# Patient Record
Sex: FEMALE | Race: ASIAN | NOT HISPANIC OR LATINO | Employment: FULL TIME
[De-identification: names, ages, dates, MRNs, and addresses within clinical notes are randomized per-mention and may not be internally consistent; named-entity substitution may affect disease eponyms.]

---

## 2017-06-17 ENCOUNTER — HEALTH MAINTENANCE LETTER (OUTPATIENT)
Age: 57
End: 2017-06-17

## 2019-11-02 ENCOUNTER — OFFICE VISIT (OUTPATIENT)
Dept: URGENT CARE | Facility: URGENT CARE | Age: 59
End: 2019-11-02
Payer: COMMERCIAL

## 2019-11-02 VITALS
TEMPERATURE: 98.6 F | SYSTOLIC BLOOD PRESSURE: 102 MMHG | DIASTOLIC BLOOD PRESSURE: 66 MMHG | BODY MASS INDEX: 21.71 KG/M2 | RESPIRATION RATE: 16 BRPM | HEART RATE: 88 BPM | WEIGHT: 115 LBS | HEIGHT: 61 IN

## 2019-11-02 DIAGNOSIS — M25.50 POLYARTHRALGIA: Primary | ICD-10-CM

## 2019-11-02 LAB
ALBUMIN SERPL-MCNC: 3.4 G/DL (ref 3.4–5)
ALP SERPL-CCNC: 104 U/L (ref 40–150)
ALT SERPL W P-5'-P-CCNC: 17 U/L (ref 0–50)
ANION GAP SERPL CALCULATED.3IONS-SCNC: 4 MMOL/L (ref 3–14)
AST SERPL W P-5'-P-CCNC: 12 U/L (ref 0–45)
BASOPHILS # BLD AUTO: 0 10E9/L (ref 0–0.2)
BASOPHILS NFR BLD AUTO: 0.1 %
BILIRUB SERPL-MCNC: 0.2 MG/DL (ref 0.2–1.3)
BUN SERPL-MCNC: 11 MG/DL (ref 7–30)
CALCIUM SERPL-MCNC: 8.3 MG/DL (ref 8.5–10.1)
CHLORIDE SERPL-SCNC: 107 MMOL/L (ref 94–109)
CO2 SERPL-SCNC: 27 MMOL/L (ref 20–32)
CREAT SERPL-MCNC: 0.65 MG/DL (ref 0.52–1.04)
CRP SERPL-MCNC: 38 MG/L (ref 0–8)
DIFFERENTIAL METHOD BLD: ABNORMAL
EOSINOPHIL # BLD AUTO: 0.2 10E9/L (ref 0–0.7)
EOSINOPHIL NFR BLD AUTO: 1.5 %
ERYTHROCYTE [DISTWIDTH] IN BLOOD BY AUTOMATED COUNT: 11.8 % (ref 10–15)
ERYTHROCYTE [SEDIMENTATION RATE] IN BLOOD BY WESTERGREN METHOD: 72 MM/H (ref 0–30)
GFR SERPL CREATININE-BSD FRML MDRD: >90 ML/MIN/{1.73_M2}
GLUCOSE SERPL-MCNC: 103 MG/DL (ref 70–99)
HCT VFR BLD AUTO: 33 % (ref 35–47)
HGB BLD-MCNC: 11 G/DL (ref 11.7–15.7)
LYMPHOCYTES # BLD AUTO: 1.7 10E9/L (ref 0.8–5.3)
LYMPHOCYTES NFR BLD AUTO: 14.8 %
MCH RBC QN AUTO: 30.4 PG (ref 26.5–33)
MCHC RBC AUTO-ENTMCNC: 33.3 G/DL (ref 31.5–36.5)
MCV RBC AUTO: 91 FL (ref 78–100)
MONOCYTES # BLD AUTO: 1.2 10E9/L (ref 0–1.3)
MONOCYTES NFR BLD AUTO: 10.4 %
NEUTROPHILS # BLD AUTO: 8.4 10E9/L (ref 1.6–8.3)
NEUTROPHILS NFR BLD AUTO: 73.2 %
PLATELET # BLD AUTO: 386 10E9/L (ref 150–450)
POTASSIUM SERPL-SCNC: 4.1 MMOL/L (ref 3.4–5.3)
PROT SERPL-MCNC: 7.3 G/DL (ref 6.8–8.8)
RBC # BLD AUTO: 3.62 10E12/L (ref 3.8–5.2)
SODIUM SERPL-SCNC: 138 MMOL/L (ref 133–144)
URATE SERPL-MCNC: 4.6 MG/DL (ref 2.6–6)
WBC # BLD AUTO: 11.5 10E9/L (ref 4–11)

## 2019-11-02 PROCEDURE — 99203 OFFICE O/P NEW LOW 30 MIN: CPT | Performed by: PHYSICIAN ASSISTANT

## 2019-11-02 PROCEDURE — 85652 RBC SED RATE AUTOMATED: CPT | Performed by: PHYSICIAN ASSISTANT

## 2019-11-02 PROCEDURE — 84550 ASSAY OF BLOOD/URIC ACID: CPT | Performed by: PHYSICIAN ASSISTANT

## 2019-11-02 PROCEDURE — 86140 C-REACTIVE PROTEIN: CPT | Performed by: PHYSICIAN ASSISTANT

## 2019-11-02 PROCEDURE — 86618 LYME DISEASE ANTIBODY: CPT | Performed by: PHYSICIAN ASSISTANT

## 2019-11-02 PROCEDURE — 85025 COMPLETE CBC W/AUTO DIFF WBC: CPT | Performed by: PHYSICIAN ASSISTANT

## 2019-11-02 PROCEDURE — 36415 COLL VENOUS BLD VENIPUNCTURE: CPT | Performed by: PHYSICIAN ASSISTANT

## 2019-11-02 PROCEDURE — 80053 COMPREHEN METABOLIC PANEL: CPT | Performed by: PHYSICIAN ASSISTANT

## 2019-11-02 RX ORDER — METHYLPREDNISOLONE 4 MG
TABLET, DOSE PACK ORAL
Qty: 21 TABLET | Refills: 0 | Status: SHIPPED | OUTPATIENT
Start: 2019-11-02 | End: 2019-12-19

## 2019-11-02 ASSESSMENT — MIFFLIN-ST. JEOR: SCORE: 1039.02

## 2019-11-02 NOTE — PROGRESS NOTES
Patient presents with:  Hand Pain: bilateral hand pain for past month. No known injury  SUBJECTIVE:  Chief Complaint   Patient presents with     Hand Pain     bilateral hand pain for past month. No known injury     Balbina Donohue is a 59 year old female presents with a chief complaint of joint pain for the past month.  No known injury.        Was seen by work comp clinic about a month ago.  Last time was yesterday.  They told her that her pain in her wrist and now her other wrist and shoulders is not work comp and she was told to be evaluated by her regular doctor.      She also now has swelling in both hands mainly fingers.  Also complains of bilateral shoulder pain.      She is taking naprosyn and tramadol for her pain.    Takes naprosyn twice a day and tramadol up to 4 times a day.      Wearing a splint on both wrists at night for the past month.,      States that she hurt her wrists at work.    She does not currently have a PCP.      SH: She is here with her  today.          Past Medical History:   Diagnosis Date     Abnormal glandular Papanicolaou smear of cervix 3/00    per patient f/u paps normal     ASCUS on Pap smear 12/18/09    Hpv-neg.     Esophageal stricture 6/21/10    benign esophageal stricture dilated at EGD     Normal delivery     2 childbirths, no complications with regnancies or delivery     Thyroid nodule 9/8/10    left thyroid lobectomy, benign degenerated colloid nodule per path     Patient Active Problem List   Diagnosis     Esophageal stricture     Thyroid nodule     CARDIOVASCULAR SCREENING; LDL GOAL LESS THAN 160     Iron deficiency anemia     B12 deficiency anemia     Vitamin D deficiency     ASCUS on Pap smear     Social History     Tobacco Use     Smoking status: Never Smoker     Smokeless tobacco: Never Used   Substance Use Topics     Alcohol use: No       ROS:  CONSTITUTIONAL:NEGATIVE for fever, chills, change in weight  INTEGUMENTARY/SKIN: NEGATIVE for worrisome rashes, moles  "or lesions  EYES: NEGATIVE for vision changes or irritation  ENT/MOUTH: NEGATIVE for ear, mouth and throat problems  RESP:NEGATIVE for significant cough or SOB  CV: NEGATIVE for chest pain, palpitations or peripheral edema  GI: NEGATIVE for nausea, abdominal pain, heartburn, or change in bowel habits  : negative  MUSCULOSKELETAL: as per HPI  NEURO: NEGATIVE for weakness, dizziness or paresthesias  ENDOCRINE: NEGATIVE for temperature intolerance, skin/hair changes  HEME/ALLERGY/IMMUNE: NEGATIVE for bleeding problems    EXAM:   /66 (Cuff Size: Adult Regular)   Pulse 88   Temp 98.6  F (37  C) (Oral)   Resp 16   Ht 1.549 m (5' 1\")   Wt 52.2 kg (115 lb)   LMP 10/06/2005   BMI 21.73 kg/m    Gen: healthy,alert,no distress  Extremity: right wrist: tenderness with subtle swelling.  Decreased ROM secondary to pain.    Left wrist: mild tenderness to palpation without swelling.    Fingers of right hand with some swelling.  No erythema.   There is not compromise to the distal circulation.  Pulses are +2 and CRT is brisk  SHOULDERS: no crepitus.  ROM is wnl.  Mildly tender to palpation bilaterally   GENERAL APPEARANCE: healthy, alert and no distress  SKIN: no suspicious lesions or rashes  NEURO: Normal strength and tone, sensory exam grossly normal, mentation intact and speech normal    Results for orders placed or performed in visit on 11/02/19   CBC with platelets and differential     Status: Abnormal   Result Value Ref Range    WBC 11.5 (H) 4.0 - 11.0 10e9/L    RBC Count 3.62 (L) 3.8 - 5.2 10e12/L    Hemoglobin 11.0 (L) 11.7 - 15.7 g/dL    Hematocrit 33.0 (L) 35.0 - 47.0 %    MCV 91 78 - 100 fl    MCH 30.4 26.5 - 33.0 pg    MCHC 33.3 31.5 - 36.5 g/dL    RDW 11.8 10.0 - 15.0 %    Platelet Count 386 150 - 450 10e9/L    % Neutrophils 73.2 %    % Lymphocytes 14.8 %    % Monocytes 10.4 %    % Eosinophils 1.5 %    % Basophils 0.1 %    Absolute Neutrophil 8.4 (H) 1.6 - 8.3 10e9/L    Absolute Lymphocytes 1.7 0.8 - 5.3 " 10e9/L    Absolute Monocytes 1.2 0.0 - 1.3 10e9/L    Absolute Eosinophils 0.2 0.0 - 0.7 10e9/L    Absolute Basophils 0.0 0.0 - 0.2 10e9/L    Diff Method Automated Method    ESR: Erythrocyte sedimentation rate     Status: Abnormal   Result Value Ref Range    Sed Rate 72 (H) 0 - 30 mm/h   CRP, inflammation     Status: Abnormal   Result Value Ref Range    CRP Inflammation 38.0 (H) 0.0 - 8.0 mg/L   Comprehensive metabolic panel (BMP + Alb, Alk Phos, ALT, AST, Total. Bili, TP)     Status: Abnormal   Result Value Ref Range    Sodium 138 133 - 144 mmol/L    Potassium 4.1 3.4 - 5.3 mmol/L    Chloride 107 94 - 109 mmol/L    Carbon Dioxide 27 20 - 32 mmol/L    Anion Gap 4 3 - 14 mmol/L    Glucose 103 (H) 70 - 99 mg/dL    Urea Nitrogen 11 7 - 30 mg/dL    Creatinine 0.65 0.52 - 1.04 mg/dL    GFR Estimate >90 >60 mL/min/[1.73_m2]    GFR Estimate If Black >90 >60 mL/min/[1.73_m2]    Calcium 8.3 (L) 8.5 - 10.1 mg/dL    Bilirubin Total 0.2 0.2 - 1.3 mg/dL    Albumin 3.4 3.4 - 5.0 g/dL    Protein Total 7.3 6.8 - 8.8 g/dL    Alkaline Phosphatase 104 40 - 150 U/L    ALT 17 0 - 50 U/L    AST 12 0 - 45 U/L   Uric acid     Status: None   Result Value Ref Range    Uric Acid 4.6 2.6 - 6.0 mg/dL     (M25.50) Polyarthralgia  (primary encounter diagnosis)  Comment:   Plan: CBC with platelets and differential, ESR:         Erythrocyte sedimentation rate, CRP,         inflammation, Lyme Disease Bhumika with reflex to         WB Serum, Comprehensive metabolic panel (BMP +         Alb, Alk Phos, ALT, AST, Total. Bili, TP), Uric        acid, methylPREDNISolone (MEDROL DOSEPAK) 4 MG         tablet therapy pack            Follow up with internal medicine within one week.  Appointment made for patient.    Patient expresses understanding and agreement with the assessment and plan as above.

## 2019-11-02 NOTE — PATIENT INSTRUCTIONS
(M25.50) Polyarthralgia  (primary encounter diagnosis)  Comment:   Plan: CBC with platelets and differential, ESR:         Erythrocyte sedimentation rate, CRP,         inflammation, Lyme Disease Bhumika with reflex to         WB Serum, Comprehensive metabolic panel (BMP +         Alb, Alk Phos, ALT, AST, Total. Bili, TP), Uric        acid, methylPREDNISolone (MEDROL DOSEPAK) 4 MG         tablet therapy pack            Follow up with internal medicine within one week.  Appointment made for patient.

## 2019-11-04 LAB — B BURGDOR IGG+IGM SER QL: 0.05 (ref 0–0.89)

## 2019-11-08 ENCOUNTER — OFFICE VISIT (OUTPATIENT)
Dept: INTERNAL MEDICINE | Facility: CLINIC | Age: 59
End: 2019-11-08
Payer: COMMERCIAL

## 2019-11-08 VITALS
RESPIRATION RATE: 18 BRPM | HEART RATE: 83 BPM | TEMPERATURE: 98.1 F | SYSTOLIC BLOOD PRESSURE: 104 MMHG | WEIGHT: 116 LBS | OXYGEN SATURATION: 99 % | BODY MASS INDEX: 21.92 KG/M2 | DIASTOLIC BLOOD PRESSURE: 66 MMHG

## 2019-11-08 DIAGNOSIS — M25.50 PAIN IN JOINT, MULTIPLE SITES: Primary | ICD-10-CM

## 2019-11-08 PROCEDURE — 99213 OFFICE O/P EST LOW 20 MIN: CPT | Performed by: PHYSICIAN ASSISTANT

## 2019-11-08 RX ORDER — IBUPROFEN 600 MG/1
600 TABLET, FILM COATED ORAL EVERY 8 HOURS PRN
Qty: 60 TABLET | Refills: 0 | Status: SHIPPED | OUTPATIENT
Start: 2019-11-08 | End: 2023-10-27

## 2019-11-08 NOTE — PROGRESS NOTES
Subjective     Balbina Donohue is a 59 year old female who presents to clinic today for the following health issues:    HPI   ED/UC Followup:    Facility:  [facility]  Date of visit: 11/02/2019  Reason for visit: hand/wrist pain, lab work showing elevated inflammation markers and anemia, medrol dose pack given   Current Status: Dose pack helped but since stopping it symptoms have come back.        Multiple site joint pain:  Onset: 1 month ago   Description: pain and swelling in hands and finger, prior shoulder and knee pain and swollen  (now gone)  - never had this before     Low hemoglobin    - history of b12 and iron deficiency anemia in the past       Reviewed and updated as needed this visit by Provider  Meds  Problems             Objective    /66   Pulse 83   Temp 98.1  F (36.7  C) (Oral)   Resp 18   Wt 52.6 kg (116 lb)   LMP 10/06/2005   SpO2 99%   BMI 21.92 kg/m    Body mass index is 21.92 kg/m .  Physical Exam   GENERAL: healthy, alert and no distress  MS: bilateral fingers are swollen along the joints and tender to touch, no warmth, maybe slight erythema, no other joint tenderness or swelling noted      Diagnostic Test Results:  Labs reviewed in Epic        Assessment & Plan     1. Pain in joint, multiple sites  - referral for further evaluation and treatment   - RHEUMATOLOGY REFERRAL  - ibuprofen (ADVIL/MOTRIN) 600 MG tablet; Take 1 tablet (600 mg) by mouth every 8 hours as needed for moderate pain  Dispense: 60 tablet; Refill: 0    Anemia noted as well, pt declines further work up for this at this time, but will follow up in 1 month for recheck and further evaluation.        Return in about 1 month (around 12/8/2019) for Physical Exam and anemia follow up .    Mimi Kemp PA-C  [facility]

## 2019-12-04 ENCOUNTER — TRANSFERRED RECORDS (OUTPATIENT)
Dept: HEALTH INFORMATION MANAGEMENT | Facility: CLINIC | Age: 59
End: 2019-12-04

## 2019-12-15 ENCOUNTER — HEALTH MAINTENANCE LETTER (OUTPATIENT)
Age: 59
End: 2019-12-15

## 2019-12-19 ENCOUNTER — ANCILLARY PROCEDURE (OUTPATIENT)
Dept: GENERAL RADIOLOGY | Facility: CLINIC | Age: 59
End: 2019-12-19
Attending: PHYSICIAN ASSISTANT
Payer: COMMERCIAL

## 2019-12-19 ENCOUNTER — OFFICE VISIT (OUTPATIENT)
Dept: INTERNAL MEDICINE | Facility: CLINIC | Age: 59
End: 2019-12-19
Payer: COMMERCIAL

## 2019-12-19 VITALS
OXYGEN SATURATION: 99 % | WEIGHT: 116.5 LBS | RESPIRATION RATE: 18 BRPM | BODY MASS INDEX: 22.01 KG/M2 | HEART RATE: 91 BPM | SYSTOLIC BLOOD PRESSURE: 112 MMHG | DIASTOLIC BLOOD PRESSURE: 66 MMHG | TEMPERATURE: 98.3 F

## 2019-12-19 DIAGNOSIS — Z11.1 SCREENING EXAMINATION FOR PULMONARY TUBERCULOSIS: Primary | ICD-10-CM

## 2019-12-19 PROCEDURE — 99213 OFFICE O/P EST LOW 20 MIN: CPT | Performed by: PHYSICIAN ASSISTANT

## 2019-12-19 PROCEDURE — 71046 X-RAY EXAM CHEST 2 VIEWS: CPT

## 2019-12-19 RX ORDER — PREDNISONE 5 MG/1
TABLET ORAL
COMMUNITY
Start: 2019-12-04 | End: 2023-10-27

## 2019-12-19 NOTE — PROGRESS NOTES
Subjective     Balbina Donohue is a 59 year old female who presents to clinic today for the following health issues:    HPI     Patient is here today because she had testing completed through rheumatology and that was one of her tests came back positive for TB. Patient states she has a cough at times.   Night sweats/fever: none   Some weight loss and loss of appetite with joint pain   Fatigue: none     Reviewed and updated as needed this visit by Provider  Meds  Problems           Objective    /66   Pulse 91   Temp 98.3  F (36.8  C) (Oral)   Resp 18   Wt 52.8 kg (116 lb 8 oz)   LMP 10/06/2005   SpO2 99%   BMI 22.01 kg/m    Body mass index is 22.01 kg/m .  Physical Exam   GENERAL: healthy, alert and no distress  RESP: lungs clear to auscultation - no rales, rhonchi or wheezes  CV: regular rates and rhythm, normal S1 S2, no S3 or S4 and no murmur, click or rub    Diagnostic Test Results:  Labs reviewed in Epic        Assessment & Plan     1. Screening examination for pulmonary tuberculosis  - if latent TB, pt would like to consider treatment will follow up in clinic to begin treatment   - XR Chest 2 Views    Declined follow up on anemia will consider on follow up.      No follow-ups on file.    Mimi Kemp PA-C  [facility]

## 2019-12-20 ENCOUNTER — TELEPHONE (OUTPATIENT)
Dept: INTERNAL MEDICINE | Facility: CLINIC | Age: 59
End: 2019-12-20

## 2019-12-20 NOTE — TELEPHONE ENCOUNTER
PCP patient son, Jah called, CTC on file.      Wondering if there was any next steps regarding Chest X-ray results.     Rhonda HALEY, RN, PHN

## 2019-12-31 ENCOUNTER — OFFICE VISIT (OUTPATIENT)
Dept: INTERNAL MEDICINE | Facility: CLINIC | Age: 59
End: 2019-12-31
Payer: COMMERCIAL

## 2019-12-31 VITALS
RESPIRATION RATE: 16 BRPM | DIASTOLIC BLOOD PRESSURE: 60 MMHG | SYSTOLIC BLOOD PRESSURE: 115 MMHG | OXYGEN SATURATION: 98 % | HEART RATE: 84 BPM | WEIGHT: 118.1 LBS | TEMPERATURE: 98.2 F | BODY MASS INDEX: 22.31 KG/M2

## 2019-12-31 DIAGNOSIS — E53.8 VITAMIN B12 DEFICIENCY (NON ANEMIC): ICD-10-CM

## 2019-12-31 DIAGNOSIS — Z22.7 TB LUNG, LATENT: Primary | ICD-10-CM

## 2019-12-31 DIAGNOSIS — D64.9 ANEMIA, UNSPECIFIED TYPE: ICD-10-CM

## 2019-12-31 DIAGNOSIS — R73.09 ABNORMAL BLOOD SUGAR: ICD-10-CM

## 2019-12-31 LAB
BASOPHILS # BLD AUTO: 0 10E9/L (ref 0–0.2)
BASOPHILS NFR BLD AUTO: 0.2 %
DIFFERENTIAL METHOD BLD: ABNORMAL
EOSINOPHIL # BLD AUTO: 0 10E9/L (ref 0–0.7)
EOSINOPHIL NFR BLD AUTO: 0.1 %
ERYTHROCYTE [DISTWIDTH] IN BLOOD BY AUTOMATED COUNT: 14.2 % (ref 10–15)
HCT VFR BLD AUTO: 36.3 % (ref 35–47)
HGB BLD-MCNC: 11.7 G/DL (ref 11.7–15.7)
LYMPHOCYTES # BLD AUTO: 1 10E9/L (ref 0.8–5.3)
LYMPHOCYTES NFR BLD AUTO: 7.9 %
MCH RBC QN AUTO: 29.6 PG (ref 26.5–33)
MCHC RBC AUTO-ENTMCNC: 32.2 G/DL (ref 31.5–36.5)
MCV RBC AUTO: 92 FL (ref 78–100)
MONOCYTES # BLD AUTO: 0.3 10E9/L (ref 0–1.3)
MONOCYTES NFR BLD AUTO: 2 %
NEUTROPHILS # BLD AUTO: 11.3 10E9/L (ref 1.6–8.3)
NEUTROPHILS NFR BLD AUTO: 89.8 %
PLATELET # BLD AUTO: 278 10E9/L (ref 150–450)
RBC # BLD AUTO: 3.95 10E12/L (ref 3.8–5.2)
VIT B12 SERPL-MCNC: 140 PG/ML (ref 193–986)
WBC # BLD AUTO: 12.6 10E9/L (ref 4–11)

## 2019-12-31 PROCEDURE — 82607 VITAMIN B-12: CPT | Performed by: PHYSICIAN ASSISTANT

## 2019-12-31 PROCEDURE — 82728 ASSAY OF FERRITIN: CPT | Performed by: PHYSICIAN ASSISTANT

## 2019-12-31 PROCEDURE — 80053 COMPREHEN METABOLIC PANEL: CPT | Performed by: PHYSICIAN ASSISTANT

## 2019-12-31 PROCEDURE — 82746 ASSAY OF FOLIC ACID SERUM: CPT | Performed by: PHYSICIAN ASSISTANT

## 2019-12-31 PROCEDURE — 99214 OFFICE O/P EST MOD 30 MIN: CPT | Performed by: PHYSICIAN ASSISTANT

## 2019-12-31 PROCEDURE — 83550 IRON BINDING TEST: CPT | Performed by: PHYSICIAN ASSISTANT

## 2019-12-31 PROCEDURE — 36415 COLL VENOUS BLD VENIPUNCTURE: CPT | Performed by: PHYSICIAN ASSISTANT

## 2019-12-31 PROCEDURE — 85025 COMPLETE CBC W/AUTO DIFF WBC: CPT | Performed by: PHYSICIAN ASSISTANT

## 2019-12-31 PROCEDURE — 83540 ASSAY OF IRON: CPT | Performed by: PHYSICIAN ASSISTANT

## 2019-12-31 RX ORDER — RIFAMPIN 300 MG/1
600 CAPSULE ORAL DAILY
Qty: 60 CAPSULE | Refills: 3 | Status: SHIPPED | OUTPATIENT
Start: 2019-12-31 | End: 2020-04-16

## 2019-12-31 NOTE — PROGRESS NOTES
Subjective     Balbina Donohue is a 59 year old female who presents to clinic today for the following health issues:    HPI     Patient is here to discuss TB treatment. She has had a positive TB gold and a negative CXR. She would like to seek treatment for latent TB. She has no other symptoms or concerns. On review of her chart she is due for follow up on anemia. Anemia was noted in Urgent Care and has not been worked up. Pt no longer has a menstrual cycle. She is a vegetarian.    Reviewed and updated as needed this visit by Provider  Meds  Problems             Objective    /60 (BP Location: Right arm, Patient Position: Chair, Cuff Size: Adult Regular)   Pulse 84   Temp 98.2  F (36.8  C) (Oral)   Resp 16   Wt 53.6 kg (118 lb 1.6 oz)   LMP 10/06/2005   SpO2 98%   BMI 22.31 kg/m    Body mass index is 22.31 kg/m .  Physical Exam   GENERAL: healthy, alert and no distress  RESP: lungs clear to auscultation - no rales, rhonchi or wheezes  CV: regular rates and rhythm, normal S1 S2, no S3 or S4 and no murmur, click or rub    Diagnostic Test Results:  Labs reviewed in Epic        Assessment & Plan     1. TB lung, latent  - reviewed risks vs benefits  - reviewed serious risks of hepatitis and what to watch for and what to if symptoms occur  - plan to recheck in 1 month   - rifampin (RIFADIN) 300 MG capsule; Take 2 capsules (600 mg) by mouth daily  Dispense: 60 capsule; Refill: 3  - Comprehensive metabolic panel  - CBC with platelets and differential    2. Anemia, unspecified type  - anemia noted on previous labs, work up as below   - Ferritin  - Iron and iron binding capacity  - Vitamin B12  - Folate       Return in about 1 month (around 1/31/2020) for medication follow up.    Mimi Kemp PA-C  [facility]

## 2020-01-01 LAB — FOLATE SERPL-MCNC: 19.2 NG/ML

## 2020-01-02 LAB
ALBUMIN SERPL-MCNC: 3.5 G/DL (ref 3.4–5)
ALP SERPL-CCNC: 93 U/L (ref 40–150)
ALT SERPL W P-5'-P-CCNC: 21 U/L (ref 0–50)
ANION GAP SERPL CALCULATED.3IONS-SCNC: 5 MMOL/L (ref 3–14)
AST SERPL W P-5'-P-CCNC: 15 U/L (ref 0–45)
BILIRUB SERPL-MCNC: 0.2 MG/DL (ref 0.2–1.3)
BUN SERPL-MCNC: 15 MG/DL (ref 7–30)
CALCIUM SERPL-MCNC: 8.5 MG/DL (ref 8.5–10.1)
CHLORIDE SERPL-SCNC: 110 MMOL/L (ref 94–109)
CO2 SERPL-SCNC: 26 MMOL/L (ref 20–32)
CREAT SERPL-MCNC: 0.68 MG/DL (ref 0.52–1.04)
FERRITIN SERPL-MCNC: 43 NG/ML (ref 8–252)
GFR SERPL CREATININE-BSD FRML MDRD: >90 ML/MIN/{1.73_M2}
GLUCOSE SERPL-MCNC: 247 MG/DL (ref 70–99)
IRON SATN MFR SERPL: 14 % (ref 15–46)
IRON SERPL-MCNC: 46 UG/DL (ref 35–180)
POTASSIUM SERPL-SCNC: 4.2 MMOL/L (ref 3.4–5.3)
PROT SERPL-MCNC: 6.3 G/DL (ref 6.8–8.8)
SODIUM SERPL-SCNC: 141 MMOL/L (ref 133–144)
TIBC SERPL-MCNC: 320 UG/DL (ref 240–430)

## 2020-01-03 RX ORDER — LANOLIN ALCOHOL/MO/W.PET/CERES
1000 CREAM (GRAM) TOPICAL DAILY
Qty: 90 TABLET | Refills: 1 | Status: SHIPPED | OUTPATIENT
Start: 2020-01-03

## 2020-01-09 DIAGNOSIS — R73.09 ABNORMAL BLOOD SUGAR: ICD-10-CM

## 2020-01-09 LAB — HBA1C MFR BLD: 5.9 % (ref 0–5.6)

## 2020-01-09 PROCEDURE — 83036 HEMOGLOBIN GLYCOSYLATED A1C: CPT | Performed by: PHYSICIAN ASSISTANT

## 2020-01-09 PROCEDURE — 36415 COLL VENOUS BLD VENIPUNCTURE: CPT | Performed by: PHYSICIAN ASSISTANT

## 2020-01-14 ENCOUNTER — TRANSFERRED RECORDS (OUTPATIENT)
Dept: HEALTH INFORMATION MANAGEMENT | Facility: CLINIC | Age: 60
End: 2020-01-14

## 2020-01-27 ENCOUNTER — TELEPHONE (OUTPATIENT)
Dept: INTERNAL MEDICINE | Facility: CLINIC | Age: 60
End: 2020-01-27

## 2020-01-27 NOTE — TELEPHONE ENCOUNTER
Reason for Call:  Other call back    Detailed comments: Patient was informed to call back if she feels fatigued.  Patient has an appointment scheduled for 01/31/2020. Please talk with her son Jah, he does have permission to talk with us.      Phone Number Patient can be reached at: Cell number on file:    Telephone Information:   Mobile [phone]       Best Time: Afternoon    Can we leave a detailed message on this number? YES    Call taken on 1/27/2020 at 4:50 PM by Dallas Ordoñez

## 2020-01-28 NOTE — TELEPHONE ENCOUNTER
Triage spoke with patient sonJah.    Advised to keep scheduled appointment. No new sx. If any other symptoms to please call back and speak with nurse. Patient is to follow up with PCP per LOV note.    Rhonda HALEY, RN, PHN

## 2020-01-31 ENCOUNTER — OFFICE VISIT (OUTPATIENT)
Dept: INTERNAL MEDICINE | Facility: CLINIC | Age: 60
End: 2020-01-31
Payer: COMMERCIAL

## 2020-01-31 VITALS
DIASTOLIC BLOOD PRESSURE: 78 MMHG | BODY MASS INDEX: 21.9 KG/M2 | OXYGEN SATURATION: 98 % | SYSTOLIC BLOOD PRESSURE: 118 MMHG | RESPIRATION RATE: 18 BRPM | TEMPERATURE: 98.3 F | HEIGHT: 61 IN | WEIGHT: 116 LBS | HEART RATE: 81 BPM

## 2020-01-31 DIAGNOSIS — Z22.7 TB LUNG, LATENT: Primary | ICD-10-CM

## 2020-01-31 DIAGNOSIS — Z11.59 ENCOUNTER FOR HEPATITIS C SCREENING TEST FOR LOW RISK PATIENT: ICD-10-CM

## 2020-01-31 DIAGNOSIS — Z13.220 LIPID SCREENING: ICD-10-CM

## 2020-01-31 DIAGNOSIS — Z11.4 ENCOUNTER FOR SCREENING FOR HIV: ICD-10-CM

## 2020-01-31 LAB
ALBUMIN SERPL-MCNC: 4.2 G/DL (ref 3.4–5)
ALP SERPL-CCNC: 93 U/L (ref 40–150)
ALT SERPL W P-5'-P-CCNC: 21 U/L (ref 0–50)
ANION GAP SERPL CALCULATED.3IONS-SCNC: 6 MMOL/L (ref 3–14)
AST SERPL W P-5'-P-CCNC: 19 U/L (ref 0–45)
BILIRUB SERPL-MCNC: 0.4 MG/DL (ref 0.2–1.3)
BUN SERPL-MCNC: 9 MG/DL (ref 7–30)
CALCIUM SERPL-MCNC: 8.7 MG/DL (ref 8.5–10.1)
CHLORIDE SERPL-SCNC: 107 MMOL/L (ref 94–109)
CHOLEST SERPL-MCNC: 160 MG/DL
CO2 SERPL-SCNC: 24 MMOL/L (ref 20–32)
CREAT SERPL-MCNC: 0.64 MG/DL (ref 0.52–1.04)
GFR SERPL CREATININE-BSD FRML MDRD: >90 ML/MIN/{1.73_M2}
GLUCOSE SERPL-MCNC: 104 MG/DL (ref 70–99)
HDLC SERPL-MCNC: 79 MG/DL
LDLC SERPL CALC-MCNC: 68 MG/DL
NONHDLC SERPL-MCNC: 81 MG/DL
POTASSIUM SERPL-SCNC: 4.4 MMOL/L (ref 3.4–5.3)
PROT SERPL-MCNC: 7.2 G/DL (ref 6.8–8.8)
SODIUM SERPL-SCNC: 137 MMOL/L (ref 133–144)
TRIGL SERPL-MCNC: 65 MG/DL

## 2020-01-31 PROCEDURE — 80061 LIPID PANEL: CPT | Performed by: PHYSICIAN ASSISTANT

## 2020-01-31 PROCEDURE — 80053 COMPREHEN METABOLIC PANEL: CPT | Performed by: PHYSICIAN ASSISTANT

## 2020-01-31 PROCEDURE — 87389 HIV-1 AG W/HIV-1&-2 AB AG IA: CPT | Performed by: PHYSICIAN ASSISTANT

## 2020-01-31 PROCEDURE — 86803 HEPATITIS C AB TEST: CPT | Performed by: PHYSICIAN ASSISTANT

## 2020-01-31 PROCEDURE — 99213 OFFICE O/P EST LOW 20 MIN: CPT | Performed by: PHYSICIAN ASSISTANT

## 2020-01-31 PROCEDURE — 36415 COLL VENOUS BLD VENIPUNCTURE: CPT | Performed by: PHYSICIAN ASSISTANT

## 2020-01-31 RX ORDER — HYDROXYCHLOROQUINE SULFATE 200 MG/1
TABLET, FILM COATED ORAL
COMMUNITY
Start: 2020-01-14

## 2020-01-31 ASSESSMENT — MIFFLIN-ST. JEOR: SCORE: 1043.55

## 2020-01-31 NOTE — PROGRESS NOTES
"Subjective     Balbina Donohue is a 59 year old female who presents to clinic today for the following health issues:    HPI   Medication Followup of Rifampin     Taking Medication as prescribed: yes    Side Effects:  tired    Medication Helping Symptoms:  Yes    Feeling more tired since starting medication     Denies abdominal symptoms, dark urine, loss of appetite     Reviewed and updated as needed this visit by Provider  Meds  Problems             Objective    /78 (BP Location: Right arm, Patient Position: Chair, Cuff Size: Adult Regular)   Pulse 81   Temp 98.3  F (36.8  C) (Oral)   Resp 18   Ht 1.549 m (5' 1\")   Wt 52.6 kg (116 lb)   LMP 10/06/2005   SpO2 98%   BMI 21.92 kg/m    Body mass index is 21.92 kg/m .  Physical Exam   GENERAL: healthy, alert and no distress  RESP: lungs clear to auscultation - no rales, rhonchi or wheezes  CV: regular rates and rhythm, normal S1 S2, no S3 or S4 and no murmur, click or rub  ABDOMEN: soft, nontender, no hepatosplenomegaly, no masses and bowel sounds normal    Diagnostic Test Results:  Labs reviewed in Epic        Assessment & Plan     1. TB lung, latent  - continue on treatment, plan to recheck in 1 month   - Comprehensive metabolic panel    2. Lipid screening  - Lipid panel reflex to direct LDL Fasting    3. Encounter for screening for HIV  - HIV Screening    4. Encounter for hepatitis C screening test for low risk patient  - Hepatitis C Screen Reflex to HCV RNA Quant and Genotype     Return in about 1 month (around 2/29/2020).    Mimi Kemp PA-C  [facility]        "

## 2020-02-02 LAB
HCV AB SERPL QL IA: NONREACTIVE
HIV 1+2 AB+HIV1 P24 AG SERPL QL IA: NONREACTIVE

## 2020-02-14 ENCOUNTER — TELEPHONE (OUTPATIENT)
Dept: INTERNAL MEDICINE | Facility: CLINIC | Age: 60
End: 2020-02-14

## 2020-02-14 NOTE — TELEPHONE ENCOUNTER
Panel Management Review      Patient has the following on her problem list: None      Composite cancer screening  Chart review shows that this patient is due/due soon for the following Pap Smear, Mammogram and Colonoscopy  Summary:    Patient is due/failing the following:   COLONOSCOPY, MAMMOGRAM, PAP and PHYSICAL    Action needed:   Patient needs office visit for Annual Wellness.    Type of outreach:    None patient has upcoming appt    Questions for provider review:    None                                                                                                                                    Jennifer Davila CMA     Chart routed to  .

## 2020-02-25 ENCOUNTER — TRANSFERRED RECORDS (OUTPATIENT)
Dept: HEALTH INFORMATION MANAGEMENT | Facility: CLINIC | Age: 60
End: 2020-02-25

## 2020-02-27 ENCOUNTER — OFFICE VISIT (OUTPATIENT)
Dept: INTERNAL MEDICINE | Facility: CLINIC | Age: 60
End: 2020-02-27
Payer: COMMERCIAL

## 2020-02-27 VITALS
HEART RATE: 77 BPM | SYSTOLIC BLOOD PRESSURE: 114 MMHG | TEMPERATURE: 98.4 F | OXYGEN SATURATION: 99 % | RESPIRATION RATE: 16 BRPM | BODY MASS INDEX: 22.03 KG/M2 | DIASTOLIC BLOOD PRESSURE: 64 MMHG | WEIGHT: 116.6 LBS

## 2020-02-27 DIAGNOSIS — E58 CALCIUM DEFICIENCY: ICD-10-CM

## 2020-02-27 DIAGNOSIS — Z22.7 TB LUNG, LATENT: Primary | ICD-10-CM

## 2020-02-27 PROCEDURE — 80053 COMPREHEN METABOLIC PANEL: CPT | Performed by: PHYSICIAN ASSISTANT

## 2020-02-27 PROCEDURE — 36415 COLL VENOUS BLD VENIPUNCTURE: CPT | Performed by: PHYSICIAN ASSISTANT

## 2020-02-27 PROCEDURE — 99213 OFFICE O/P EST LOW 20 MIN: CPT | Performed by: PHYSICIAN ASSISTANT

## 2020-02-28 LAB
ALBUMIN SERPL-MCNC: 3.8 G/DL (ref 3.4–5)
ALP SERPL-CCNC: 99 U/L (ref 40–150)
ALT SERPL W P-5'-P-CCNC: 22 U/L (ref 0–50)
ANION GAP SERPL CALCULATED.3IONS-SCNC: 4 MMOL/L (ref 3–14)
AST SERPL W P-5'-P-CCNC: 18 U/L (ref 0–45)
BILIRUB SERPL-MCNC: 0.3 MG/DL (ref 0.2–1.3)
BUN SERPL-MCNC: 13 MG/DL (ref 7–30)
CALCIUM SERPL-MCNC: 8.1 MG/DL (ref 8.5–10.1)
CHLORIDE SERPL-SCNC: 109 MMOL/L (ref 94–109)
CO2 SERPL-SCNC: 27 MMOL/L (ref 20–32)
CREAT SERPL-MCNC: 0.6 MG/DL (ref 0.52–1.04)
GFR SERPL CREATININE-BSD FRML MDRD: >90 ML/MIN/{1.73_M2}
GLUCOSE SERPL-MCNC: 87 MG/DL (ref 70–99)
POTASSIUM SERPL-SCNC: 4 MMOL/L (ref 3.4–5.3)
PROT SERPL-MCNC: 7 G/DL (ref 6.8–8.8)
SODIUM SERPL-SCNC: 140 MMOL/L (ref 133–144)

## 2020-03-19 ENCOUNTER — DOCUMENTATION ONLY (OUTPATIENT)
Dept: INTERNAL MEDICINE | Facility: CLINIC | Age: 60
End: 2020-03-19

## 2020-03-19 DIAGNOSIS — Z22.7 TB LUNG, LATENT: Primary | ICD-10-CM

## 2020-03-27 DIAGNOSIS — Z22.7 TB LUNG, LATENT: ICD-10-CM

## 2020-03-27 LAB
ALBUMIN SERPL-MCNC: 3.7 G/DL (ref 3.4–5)
ALP SERPL-CCNC: 87 U/L (ref 40–150)
ALT SERPL W P-5'-P-CCNC: 24 U/L (ref 0–50)
ANION GAP SERPL CALCULATED.3IONS-SCNC: 3 MMOL/L (ref 3–14)
AST SERPL W P-5'-P-CCNC: 18 U/L (ref 0–45)
BILIRUB SERPL-MCNC: 0.3 MG/DL (ref 0.2–1.3)
BUN SERPL-MCNC: 11 MG/DL (ref 7–30)
CALCIUM SERPL-MCNC: 8.5 MG/DL (ref 8.5–10.1)
CHLORIDE SERPL-SCNC: 107 MMOL/L (ref 94–109)
CO2 SERPL-SCNC: 27 MMOL/L (ref 20–32)
CREAT SERPL-MCNC: 0.61 MG/DL (ref 0.52–1.04)
GFR SERPL CREATININE-BSD FRML MDRD: >90 ML/MIN/{1.73_M2}
GLUCOSE SERPL-MCNC: 90 MG/DL (ref 70–99)
POTASSIUM SERPL-SCNC: 4.2 MMOL/L (ref 3.4–5.3)
PROT SERPL-MCNC: 6.8 G/DL (ref 6.8–8.8)
SODIUM SERPL-SCNC: 137 MMOL/L (ref 133–144)

## 2020-03-27 PROCEDURE — 80053 COMPREHEN METABOLIC PANEL: CPT | Performed by: PHYSICIAN ASSISTANT

## 2020-03-27 PROCEDURE — 36415 COLL VENOUS BLD VENIPUNCTURE: CPT | Performed by: PHYSICIAN ASSISTANT

## 2020-04-16 ENCOUNTER — TELEPHONE (OUTPATIENT)
Dept: INTERNAL MEDICINE | Facility: CLINIC | Age: 60
End: 2020-04-16

## 2020-04-16 DIAGNOSIS — Z22.7 TB LUNG, LATENT: ICD-10-CM

## 2020-04-16 RX ORDER — RIFAMPIN 300 MG/1
600 CAPSULE ORAL DAILY
Qty: 60 CAPSULE | Refills: 3 | Status: SHIPPED | OUTPATIENT
Start: 2020-04-16 | End: 2024-02-13

## 2020-04-16 NOTE — TELEPHONE ENCOUNTER
rifadin      Last Written Prescription Date:  12/31/19  Last Fill Quantity: 60,   # refills: 3  Last Office Visit: 2/27/20  Future Office visit:       Routing refill request to provider for review/approval because:  Drug not on the [facility], P or [facility] refill protocol or controlled substance

## 2020-04-20 ENCOUNTER — TRANSFERRED RECORDS (OUTPATIENT)
Dept: HEALTH INFORMATION MANAGEMENT | Facility: CLINIC | Age: 60
End: 2020-04-20

## 2020-04-20 LAB
ALT SERPL-CCNC: 19 IU/L (ref 5–35)
AST SERPL-CCNC: 22 U/L (ref 5–34)
CREAT SERPL-MCNC: 0.68 MG/DL (ref 0.5–1.3)
GFR SERPL CREATININE-BSD FRML MDRD: 94.1 ML/MIN/1.73M2

## 2020-04-24 NOTE — TELEPHONE ENCOUNTER
Noted refill while provider was out. Patient should be notified that she can stop this once she has completed 4 months of treatment. Please call patient with this (consent to communicate form on file call son with results lovely [phone])

## 2020-06-25 ENCOUNTER — TRANSFERRED RECORDS (OUTPATIENT)
Dept: HEALTH INFORMATION MANAGEMENT | Facility: CLINIC | Age: 60
End: 2020-06-25

## 2020-06-25 LAB
ALT SERPL-CCNC: 22 IU/L (ref 5–35)
AST SERPL-CCNC: 35 U/L (ref 5–34)

## 2020-11-17 ENCOUNTER — TRANSFERRED RECORDS (OUTPATIENT)
Dept: HEALTH INFORMATION MANAGEMENT | Facility: CLINIC | Age: 60
End: 2020-11-17

## 2020-11-17 LAB
ALT SERPL-CCNC: 15 IU/L (ref 5–35)
AST SERPL-CCNC: 24 U/L (ref 5–34)
CREAT SERPL-MCNC: 0.63 MG/DL (ref 0.5–1.3)

## 2021-01-15 ENCOUNTER — HEALTH MAINTENANCE LETTER (OUTPATIENT)
Age: 61
End: 2021-01-15

## 2021-03-16 ENCOUNTER — TRANSFERRED RECORDS (OUTPATIENT)
Dept: HEALTH INFORMATION MANAGEMENT | Facility: CLINIC | Age: 61
End: 2021-03-16

## 2021-03-16 LAB
ALT SERPL-CCNC: 21 IU/L (ref 5–35)
AST SERPL-CCNC: 27 U/L (ref 5–34)
CREAT SERPL-MCNC: 0.62 MG/DL (ref 0.5–1.3)

## 2021-05-19 ENCOUNTER — TRANSFERRED RECORDS (OUTPATIENT)
Dept: HEALTH INFORMATION MANAGEMENT | Facility: CLINIC | Age: 61
End: 2021-05-19

## 2021-05-19 LAB
ALT SERPL-CCNC: 20 IU/L (ref 5–35)
AST SERPL-CCNC: 27 U/L (ref 5–34)
CREAT SERPL-MCNC: 0.69 MG/DL (ref 0.5–1.3)

## 2021-09-04 ENCOUNTER — HEALTH MAINTENANCE LETTER (OUTPATIENT)
Age: 61
End: 2021-09-04

## 2021-09-20 ENCOUNTER — TRANSFERRED RECORDS (OUTPATIENT)
Dept: HEALTH INFORMATION MANAGEMENT | Facility: CLINIC | Age: 61
End: 2021-09-20

## 2021-09-20 LAB
ALT SERPL-CCNC: 18 LU/L (ref 5–35)
AST SERPL-CCNC: 26 U/L (ref 5–34)
CREATININE (EXTERNAL): 0.58 MG/DL (ref 0.5–1.3)

## 2021-12-25 ENCOUNTER — HEALTH MAINTENANCE LETTER (OUTPATIENT)
Age: 61
End: 2021-12-25

## 2022-01-19 ENCOUNTER — TRANSFERRED RECORDS (OUTPATIENT)
Dept: HEALTH INFORMATION MANAGEMENT | Facility: CLINIC | Age: 62
End: 2022-01-19
Payer: COMMERCIAL

## 2022-01-19 LAB
ALT SERPL-CCNC: 13 IU/L (ref 5–35)
AST SERPL-CCNC: 24 U/L (ref 5–34)
CREATININE (EXTERNAL): 0.65 MG/DL (ref 0.5–1.3)
GFR ESTIMATED (EXTERNAL): 98.8 ML/MIN/1.73M2

## 2022-02-19 ENCOUNTER — HEALTH MAINTENANCE LETTER (OUTPATIENT)
Age: 62
End: 2022-02-19

## 2022-05-19 ENCOUNTER — TRANSFERRED RECORDS (OUTPATIENT)
Dept: HEALTH INFORMATION MANAGEMENT | Facility: CLINIC | Age: 62
End: 2022-05-19
Payer: COMMERCIAL

## 2022-05-19 LAB
ALT SERPL-CCNC: 14 IU/L (ref 5–35)
AST SERPL-CCNC: 27 U/L (ref 5–34)
CREATININE (EXTERNAL): 0.66 MG/DL (ref 0.5–1.3)
GFR ESTIMATED (EXTERNAL): 96.8 ML/MIN/1.73M2

## 2022-09-26 ENCOUNTER — TRANSFERRED RECORDS (OUTPATIENT)
Dept: HEALTH INFORMATION MANAGEMENT | Facility: CLINIC | Age: 62
End: 2022-09-26

## 2022-09-26 ENCOUNTER — TRANSFERRED RECORDS (OUTPATIENT)
Dept: INTERNAL MEDICINE | Facility: CLINIC | Age: 62
End: 2022-09-26

## 2022-09-26 LAB
ALT SERPL-CCNC: 20 IU/L (ref 5–35)
AST SERPL-CCNC: 30 U/L (ref 5–34)
CREATININE (EXTERNAL): 0.66 MG/DL (ref 0.5–1.3)

## 2022-10-16 ENCOUNTER — HEALTH MAINTENANCE LETTER (OUTPATIENT)
Age: 62
End: 2022-10-16

## 2023-02-14 ENCOUNTER — OFFICE VISIT (OUTPATIENT)
Dept: URGENT CARE | Facility: URGENT CARE | Age: 63
End: 2023-02-14
Payer: COMMERCIAL

## 2023-02-14 VITALS
WEIGHT: 115 LBS | SYSTOLIC BLOOD PRESSURE: 147 MMHG | OXYGEN SATURATION: 98 % | RESPIRATION RATE: 14 BRPM | DIASTOLIC BLOOD PRESSURE: 87 MMHG | TEMPERATURE: 98.6 F | BODY MASS INDEX: 21.73 KG/M2 | HEART RATE: 76 BPM

## 2023-02-14 DIAGNOSIS — R20.0 RIGHT ARM NUMBNESS: Primary | ICD-10-CM

## 2023-02-14 DIAGNOSIS — M79.621 PAIN OF RIGHT UPPER ARM: ICD-10-CM

## 2023-02-14 DIAGNOSIS — M54.2 NECK PAIN ON RIGHT SIDE: ICD-10-CM

## 2023-02-14 PROCEDURE — 99203 OFFICE O/P NEW LOW 30 MIN: CPT | Performed by: FAMILY MEDICINE

## 2023-02-14 RX ORDER — METHYLPREDNISOLONE 4 MG
TABLET, DOSE PACK ORAL
Qty: 21 TABLET | Refills: 0 | Status: SHIPPED | OUTPATIENT
Start: 2023-02-14 | End: 2023-10-27

## 2023-02-14 NOTE — PROGRESS NOTES
SUBJECTIVE:  Chief Complaint   Patient presents with     Musculoskeletal Problem     Right arm and Right shoulder x 2 months no injury, patient has h/o arthritis sees Rheumatologist     .fayt who presents with a chief complaint of  right neck and arm pain and numbness.  Symptoms began 2 month(s) ago , are moderate andstill present.  Context:Injury: no    Past Medical History:   Diagnosis Date     Abnormal glandular Papanicolaou smear of cervix 3/00    per patient f/u paps normal     ASCUS on Pap smear 12/18/09    Hpv-neg.     Esophageal stricture 6/21/10    benign esophageal stricture dilated at EGD     Normal delivery     2 childbirths, no complications with regnancies or delivery     Thyroid nodule 9/8/10    left thyroid lobectomy, benign degenerated colloid nodule per path     Allergies   Allergen Reactions     Prevacid [Lansoprazole] Rash     .socx    ROS:Review of systems negative except as stated below    EXAM: BP (!) 147/87   Pulse 76   Temp 98.6  F (37  C) (Oral)   Resp 14   Wt 52.2 kg (115 lb)   LMP 10/06/2005   SpO2 98%   BMI 21.73 kg/m    Exam:neck  tenderness to palpation and pain with rom  GENERAL APPEARANCE: healthy, alert and no distress  EXTREMITIES: peripheral pulses normal  SKIN: no suspicious lesions or rashes  NEURO: Normal strength and tone, sensory exam grossly normal, mentation intact and speech normal    X-RAY was not done.    ASSESSMENT:     ICD-10-CM    1. Right arm numbness  R20.0 methylPREDNISolone (MEDROL DOSEPAK) 4 MG tablet therapy pack     Orthopedic  Referral      2. Pain of right upper arm  M79.621 methylPREDNISolone (MEDROL DOSEPAK) 4 MG tablet therapy pack     Orthopedic  Referral      3. Neck pain on right side  M54.2 methylPREDNISolone (MEDROL DOSEPAK) 4 MG tablet therapy pack     Orthopedic  Referral

## 2023-03-27 ENCOUNTER — TRANSFERRED RECORDS (OUTPATIENT)
Dept: HEALTH INFORMATION MANAGEMENT | Facility: CLINIC | Age: 63
End: 2023-03-27

## 2023-03-27 LAB
ALT SERPL-CCNC: 24 IU/L (ref 5–35)
AST SERPL-CCNC: 34 U/L (ref 5–34)
CREATININE (EXTERNAL): 0.67 MG/DL (ref 0.5–1.3)
GFR ESTIMATED (EXTERNAL): 94.8 ML/MIN/1.73M2

## 2023-04-01 ENCOUNTER — HEALTH MAINTENANCE LETTER (OUTPATIENT)
Age: 63
End: 2023-04-01

## 2023-08-22 ENCOUNTER — OFFICE VISIT (OUTPATIENT)
Dept: URGENT CARE | Facility: URGENT CARE | Age: 63
End: 2023-08-22
Payer: COMMERCIAL

## 2023-08-22 VITALS
WEIGHT: 117 LBS | HEART RATE: 82 BPM | RESPIRATION RATE: 20 BRPM | DIASTOLIC BLOOD PRESSURE: 91 MMHG | TEMPERATURE: 98.2 F | BODY MASS INDEX: 22.11 KG/M2 | SYSTOLIC BLOOD PRESSURE: 167 MMHG | OXYGEN SATURATION: 98 %

## 2023-08-22 DIAGNOSIS — M54.2 NECK PAIN: Primary | ICD-10-CM

## 2023-08-22 DIAGNOSIS — M79.2 RADICULAR PAIN IN RIGHT ARM: ICD-10-CM

## 2023-08-22 PROCEDURE — 99214 OFFICE O/P EST MOD 30 MIN: CPT | Performed by: FAMILY MEDICINE

## 2023-08-22 RX ORDER — METHYLPREDNISOLONE 4 MG
TABLET, DOSE PACK ORAL
Qty: 21 TABLET | Refills: 0 | Status: SHIPPED | OUTPATIENT
Start: 2023-08-22 | End: 2023-10-27

## 2023-08-22 RX ORDER — METHOCARBAMOL 750 MG/1
750 TABLET, FILM COATED ORAL 4 TIMES DAILY
Qty: 28 TABLET | Refills: 0 | Status: SHIPPED | OUTPATIENT
Start: 2023-08-22 | End: 2023-08-29

## 2023-08-22 NOTE — PROGRESS NOTES
SUBJECTIVE:  Chief Complaint   Patient presents with    Urgent Care     Right shoulder and neck pain x 2 days no injury    .ident who presents with a chief complaint of  right upper arm and neck pain.  Symptoms began 1 day(s) ago , are moderate andstill present.  Context:Injury: no    Past Medical History:   Diagnosis Date    Abnormal glandular Papanicolaou smear of cervix 3/00    per patient f/u paps normal    ASCUS on Pap smear 12/18/09    Hpv-neg.    Esophageal stricture 6/21/10    benign esophageal stricture dilated at EGD    Normal delivery     2 childbirths, no complications with regnancies or delivery    Thyroid nodule 9/8/10    left thyroid lobectomy, benign degenerated colloid nodule per path       Past Surgical History:   Procedure Laterality Date    [facility] THYROID LOBECTOMY,UNILAT  9/8/10    Left thyroid lobectomy, benign thyroid nodule    [facility] UGI ENDOSCOPY, SIMPLE EXAM  11/17/05    normal EGD       Family History   Problem Relation Age of Onset    Unknown/Adopted Father     Hypertension Mother     Family History Negative Sister     Family History Negative Sister     Family History Negative Brother     Family History Negative Brother        Social History     Tobacco Use    Smoking status: Never    Smokeless tobacco: Never   Substance Use Topics    Alcohol use: No        ROS:CONSTITUTIONAL:NEGATIVE for fever, chills, change in weight  Review of systems negative except as stated below  No CP/SOB    EXAM:BP (!) 167/91   Pulse 82   Temp 98.2  F (36.8  C) (Tympanic)   Resp 20   Wt 53.1 kg (117 lb)   LMP 10/06/2005   SpO2 98%   BMI 22.11 kg/m    Exam:neck  tenderness to palpation and pain with rom  GENERAL APPEARANCE: healthy, alert and no distress  EXTREMITIES: peripheral pulses normal  SKIN: no suspicious lesions or rashes  NEURO: Normal strength and tone, sensory exam grossly normal, mentation intact and speech normal    X-RAY was not done.    ASSESSMENT:     ICD-10-CM    1. Neck pain  M54.2  methylPREDNISolone (MEDROL DOSEPAK) 4 MG tablet therapy pack     methocarbamol (ROBAXIN) 750 MG tablet      2. Radicular pain in right arm  M79.2 methylPREDNISolone (MEDROL DOSEPAK) 4 MG tablet therapy pack     methocarbamol (ROBAXIN) 750 MG tablet

## 2023-08-28 ENCOUNTER — ANCILLARY PROCEDURE (OUTPATIENT)
Dept: GENERAL RADIOLOGY | Facility: CLINIC | Age: 63
End: 2023-08-28
Attending: FAMILY MEDICINE
Payer: COMMERCIAL

## 2023-08-28 ENCOUNTER — OFFICE VISIT (OUTPATIENT)
Dept: URGENT CARE | Facility: URGENT CARE | Age: 63
End: 2023-08-28
Payer: COMMERCIAL

## 2023-08-28 VITALS
HEART RATE: 81 BPM | OXYGEN SATURATION: 97 % | SYSTOLIC BLOOD PRESSURE: 117 MMHG | BODY MASS INDEX: 22.3 KG/M2 | WEIGHT: 118 LBS | TEMPERATURE: 97.8 F | DIASTOLIC BLOOD PRESSURE: 79 MMHG

## 2023-08-28 DIAGNOSIS — M54.2 NECK PAIN: Primary | ICD-10-CM

## 2023-08-28 DIAGNOSIS — M79.2 RADICULAR PAIN IN RIGHT ARM: ICD-10-CM

## 2023-08-28 PROCEDURE — 99214 OFFICE O/P EST MOD 30 MIN: CPT | Performed by: FAMILY MEDICINE

## 2023-08-28 PROCEDURE — 72040 X-RAY EXAM NECK SPINE 2-3 VW: CPT | Mod: TC | Performed by: RADIOLOGY

## 2023-08-28 RX ORDER — METHOCARBAMOL 750 MG/1
750 TABLET, FILM COATED ORAL 4 TIMES DAILY
Qty: 28 TABLET | Refills: 0 | Status: SHIPPED | OUTPATIENT
Start: 2023-08-28 | End: 2023-09-04

## 2023-08-28 NOTE — PROGRESS NOTES
SUBJECTIVE:  Chief Complaint   Patient presents with    Urgent Care     Pt reports being seen last week. Pain continues to right arm, shoulder.   .ident who presents with a chief complaint of  right upper arm and neck pain.  Symptoms began week(s) ago , are moderate andstill present and improving.  Context:Injury: no    Past Medical History:   Diagnosis Date    Abnormal glandular Papanicolaou smear of cervix 3/00    per patient f/u paps normal    ASCUS on Pap smear 12/18/09    Hpv-neg.    Esophageal stricture 6/21/10    benign esophageal stricture dilated at EGD    Normal delivery     2 childbirths, no complications with regnancies or delivery    Thyroid nodule 9/8/10    left thyroid lobectomy, benign degenerated colloid nodule per path     Allergies   Allergen Reactions    Prevacid [Lansoprazole] Rash     .socx    ROS:Review of systems negative except as stated below  No Chest Pain    EXAM: /79   Pulse 81   Temp 97.8  F (36.6  C) (Tympanic)   Wt 53.5 kg (118 lb)   LMP 10/06/2005   SpO2 97%   BMI 22.30 kg/m    Exam:upper arm and neck  tenderness to palpation and pain with rom  GENERAL APPEARANCE: healthy, alert and no distress  EXTREMITIES: peripheral pulses normal  SKIN: no suspicious lesions or rashes  NEURO: Normal strength and tone, sensory exam grossly normal, mentation intact and speech normal    X-RAY with straightening on cervicale spine, read by Dr. Low Carrasco    ASSESSMENT:     ICD-10-CM    1. Neck pain  M54.2 XR Cervical Spine 2/3 Views     methocarbamol (ROBAXIN) 750 MG tablet     Orthopedic  Referral      2. Radicular pain in right arm  M79.2 XR Cervical Spine 2/3 Views     methocarbamol (ROBAXIN) 750 MG tablet     Orthopedic  Referral

## 2023-09-15 ENCOUNTER — OFFICE VISIT (OUTPATIENT)
Dept: PHYSICAL MEDICINE AND REHAB | Facility: CLINIC | Age: 63
End: 2023-09-15
Attending: FAMILY MEDICINE
Payer: COMMERCIAL

## 2023-09-15 VITALS
HEART RATE: 82 BPM | WEIGHT: 115 LBS | HEIGHT: 62 IN | DIASTOLIC BLOOD PRESSURE: 90 MMHG | SYSTOLIC BLOOD PRESSURE: 152 MMHG | BODY MASS INDEX: 21.16 KG/M2

## 2023-09-15 DIAGNOSIS — M79.2 RADICULAR PAIN IN RIGHT ARM: ICD-10-CM

## 2023-09-15 DIAGNOSIS — M54.2 NECK PAIN: ICD-10-CM

## 2023-09-15 PROCEDURE — 99204 OFFICE O/P NEW MOD 45 MIN: CPT | Performed by: NURSE PRACTITIONER

## 2023-09-15 RX ORDER — GABAPENTIN 300 MG/1
CAPSULE ORAL
Qty: 180 CAPSULE | Refills: 0 | Status: SHIPPED | OUTPATIENT
Start: 2023-09-15

## 2023-09-15 RX ORDER — IBUPROFEN 600 MG/1
600 TABLET, FILM COATED ORAL EVERY 8 HOURS PRN
Qty: 45 TABLET | Refills: 0 | Status: SHIPPED | OUTPATIENT
Start: 2023-09-15

## 2023-09-15 RX ORDER — SULFASALAZINE 500 MG/1
1000 TABLET ORAL 2 TIMES DAILY
COMMUNITY

## 2023-09-15 ASSESSMENT — PAIN SCALES - GENERAL: PAINLEVEL: EXTREME PAIN (8)

## 2023-09-15 NOTE — LETTER
9/15/2023         RE: Balbina Donohue  [id number]  [facility] [address]        Dear Colleague,    Thank you for referring your patient, Balbina Donohue, to the [facility]. Please see a copy of my visit note below.    ASSESSMENT: Balbina Donohue is a 63 year old female presents for consultation at the request of PCP No Ref-Primary, Physician, who presents today for new patient evaluation of :     -Cervical radiculopathy    Patient has weakness in her right hand and arm.  Given her right arm pain and numbness, which started 1 month ago and has not improved with steroids, I would recommend an MRI cervical spine for treatment planning.  I will send as needed anti-inflammatory ibuprofen which she can take 3 times a day as needed.  I will also add gabapentin today.  We will consider treatment plan based on MRI results.  Given her weakness, if there is a concerning degree of nerve impingement, I would have a low threshold to refer to neurosurgery.  If disc or neuroforaminal narrowing appears mild to moderate, I would likely order physical therapy, consider other medication options, and/or a cervical spine epidural steroid injection.  She will call to discuss MRI results we will decide plan        9/9/2023     3:05 PM   OSWESTRY DISABILITY INDEX   Count 10   Sum 5   Oswestry Score (%) 10 %            Diagnoses and all orders for this visit:  Neck pain  -     Orthopedic  Referral  Radicular pain in right arm  -     Orthopedic  Referral  -     MR Cervical Spine w/o Contrast; Future  -     gabapentin (NEURONTIN) 300 MG capsule; 300mg at bedtime x 3 days, twice daily x 3 days, then three times daily x 3 days. Increase to 600mg TID per titration schedule.  -     ibuprofen (ADVIL/MOTRIN) 600 MG tablet; Take 1 tablet (600 mg) by mouth every 8 hours as needed for moderate pain Take with food. Stop if blood in stool.       PLAN:  Reviewed spine anatomy and disease  process. Discussed diagnosis and treatment options with the patient today. A shared decision making model was used. The patient's values and choices were respected. The following represents what was discussed and decided upon by the provider and the patient.     -DIAGNOSTIC TESTS:  Images were personally reviewed and interpreted and explained to patient today using spine model.   -- MRI cervical spine without contrast    -PHYSICAL THERAPY:    -Would consider starting physical therapy depending on results  Discussed the importance of core strengthening, ROM, stretching exercises with the patient and how each of these entities is important in decreasing pain.  Explained to the patient that the purpose of physical therapy is to teach the patient a home exercise program.  These exercises need to be performed every day in order to decrease pain and prevent future occurrences of pain.    -MEDICATIONS:    -I added ibuprofen 600 mg to take 3 times per day as needed for pain control.  -I added gabapentin to be uptitrated gradually per instructions  -She has already taken a Medrol Dosepak without any relief  Discussed multiple medication options today with patient. Discussed risks, side effects, and proper use of medications. Patient verbalized understanding.    -INTERVENTIONS:    Discussed the role for cervical epidural steroid injections and pain control.  Would need more of a risk and benefits discussion if this were to be pursued in the future, depending on imaging results.  She would be a good candidate, if imaging results correlated with her right arm symptoms    -PATIENT EDUCATION: Total time of 30 minutes, on the day of service, spent with the patient, reviewing the chart, placing orders, and documenting.   -Today we also discussed the issues related to the pros and cons of the current treatment plan.    -FOLLOW-UP: Call for MRI results and we will decide plan    Advised patient to call the [facility] if symptoms  worsen or if they develop red flag symptoms such as numbness, weakness, severe pain uncontrolled by current pain med regimen, or any new or worsening problems controlling bladder and bowel function.   ______________________________________________________________________    SUBJECTIVE:   Balbina Donohue  is a 63 year old female history of rheumatoid arthritis who presents today for new patient evaluation of neck pain radiating into her right arm    Patient describes neck pain at an 8 out of 10 radiating into her right upper arm and forearm.  Symptoms began 1 month ago without injury, she woke up with this in the morning.  Felt like she slept wrong.  She describes numbness and tingling in the same distribution, with weakness of the arm and hand.  Pain at best is a 6 out of 10, improves somewhat with massage of the arm.  Activity notably makes her pain worse.  Describes it as numbness, sharp.  She denies any left arm symptoms, imbalance, loss of bowel or bladder control, or leg symptoms.    She was given a prescription for Medrol Dosepak, which she took as directed and experienced no relief of symptoms.  She has not done physical therapy, had any previous epidural steroid injections, or any cervical spine surgeries.  She is here with her son today, who is translating at her request    -Treatment to Date:     -Medications:  -Medrol Dosepak    Current Outpatient Medications   Medication     cyanocobalamin (VITAMIN B-12) 1000 MCG tablet     gabapentin (NEURONTIN) 300 MG capsule     hydroxychloroquine (PLAQUENIL) 200 MG tablet     ibuprofen (ADVIL/MOTRIN) 600 MG tablet     NAPROXEN PO     ibuprofen (ADVIL/MOTRIN) 600 MG tablet     methylPREDNISolone (MEDROL DOSEPAK) 4 MG tablet therapy pack     methylPREDNISolone (MEDROL DOSEPAK) 4 MG tablet therapy pack     predniSONE (DELTASONE) 5 MG tablet     rifampin (RIFADIN) 300 MG capsule     sulfaSALAzine (AZULFIDINE) 500 MG tablet     No current facility-administered medications  for this visit.       Allergies   Allergen Reactions     Prevacid [Lansoprazole] Rash       Past Medical History:   Diagnosis Date     Abnormal glandular Papanicolaou smear of cervix 3/00    per patient f/u paps normal     ASCUS on Pap smear 12/18/09    Hpv-neg.     Esophageal stricture 6/21/10    benign esophageal stricture dilated at EGD     Normal delivery     2 childbirths, no complications with regnancies or delivery     Thyroid nodule 9/8/10    left thyroid lobectomy, benign degenerated colloid nodule per path        Patient Active Problem List   Diagnosis     Esophageal stricture     Thyroid nodule     Iron deficiency anemia     B12 deficiency anemia     Vitamin D deficiency     ASCUS on Pap smear       Past Surgical History:   Procedure Laterality Date     HC THYROID LOBECTOMY,UNILAT  9/8/10    Left thyroid lobectomy, benign thyroid nodule     [facility] UGI ENDOSCOPY, SIMPLE EXAM  11/17/05    normal EGD       Family History   Problem Relation Age of Onset     Unknown/Adopted Father      Hypertension Mother      Family History Negative Sister      Family History Negative Sister      Family History Negative Brother      Family History Negative Brother        Reviewed past medical, surgical, and family history with patient found on new patient intake packet located in EMR Media tab.     SOCIAL HX: Non-smoker, no alcohol use, no recreational drug use    Oswestry (GAMA) Questionnaire:        9/9/2023     3:05 PM   OSWESTRY DISABILITY INDEX   Count 10   Sum 5   Oswestry Score (%) 10 %       Neck Disability Index:      9/9/2023     3:06 PM   Neck Disability Index (  Malik H. and Rubina LAWTON. 1991. All rights reserved.; used with permission)   SECTION 1 - PAIN INTENSITY 0   SECTION 2 - PERSONAL CARE 0   SECTION 3 - LIFTING 0   SECTION 4 - READING 0   SECTION 5 - HEADACHES 0   SECTION 6 - CONCENTRATION 0   SECTION 7 - WORK 0   SECTION 8 - DRIVING 0   SECTION 9 - SLEEPING 0   SECTION 10 - RECREATION 0   Count 10   Sum 0   Raw  "Score: /50 0   Neck Disability Index Score: (%) 0 %          PHQ-2 Score:       9/15/2023     2:51 PM 11/8/2019    11:15 AM   PHQ-2 ( 1999 [employer])   Q1: Little interest or pleasure in doing things 0 0   Q2: Feeling down, depressed or hopeless 0 0   PHQ-2 Score 0 0   PHQ-2 Total Score (12-17 Years)- Positive if 3 or more points; Administer PHQ-A if positive  0          ROS: Positive for muscle pain. Specifically negative for bowel/bladder dysfunction, balance changes, headache, dizziness, foot drop, fevers, chills, appetite changes, nausea/vomiting, unexplained weight loss. Otherwise 13 systems reviewed are negative. Please see the patient's intake questionnaire from today for details.    Answers submitted by the patient for this visit:  Symptoms you have experienced in the last 30 days (Submitted on 9/9/2023)  General Symptoms: No  Skin Symptoms: No  HENT Symptoms: No  EYE SYMPTOMS: No  HEART SYMPTOMS: No  LUNG SYMPTOMS: No  INTESTINAL SYMPTOMS: No  URINARY SYMPTOMS: No  GYNECOLOGIC SYMPTOMS: No  BREAST SYMPTOMS: No  SKELETAL SYMPTOMS: No  BLOOD SYMPTOMS: No  NERVOUS SYSTEM SYMPTOMS: No  MENTAL HEALTH SYMPTOMS: No    OBJECTIVE:  BP (!) 152/90   Pulse 82   Ht 5' 2\" (1.575 m)   Wt 115 lb (52.2 kg)   LMP 10/06/2005   BMI 21.03 kg/m      PHYSICAL EXAMINATION:  --CONSTITUTIONAL: Vital signs as above. No acute distress. The patient is well nourished and well groomed.  --PSYCHIATRIC: The patient is awake, alert, oriented to person, place, and time, and answering questions appropriately with clear speech. Appropriate mood and affect   --HEENT: Sclera are non-injected. Extraocular muscles are intact. Moist oral mucosa.  --SKIN: Skin over the face, bilateral upper extremities, and posterior torso is clean, dry, intact without rashes.  --RESPIRATORY: Normal rhythm and effort. No abnormal accessory muscle breathing patterns noted.     --NEUROLOGIC: CN III-XII are grossly intact.   --GROSS MOTOR: Easily arises from a seated " position. Toe walking, heel walking, and tandem gait are normal.      --UPPER EXTREMITY MOTOR TESTING:  Shoulder abduction: right 5/5, left 5/5  Triceps: right 5-/5 left 5/5  Biceps:right 5-/5  left 5/5,   Wrist flexion: right 5/5  left 5/5,   Hand :  right 5-/5  left 5/5,   Intrinsics: right 4+/5  left 5/5,   Extensors: right 4+/5  left 5/5,     --LOWER EXTREMITY MOTOR TESTING  Hip flexion: right 5/5  left 5/5,   Quads:right 5/5  left 5/5,   Hamstrings: right 5/5  left 5/5,   Dorsiflexion: right 5/5  left 5/5,   Plantarflexion: right 5/5  left 5/5,   EHL: right 5/5  left 5/5,     REFLEXES: 2/4 symmetric triceps, biceps, brachioradialis bilaterally. 2/4 symmetric patellar, achilles reflex bilaterally.  Negative Clonus, Babinski, and Tijerina's bilaterally.      SENSATION: of the upper and lower extremities is decreased in the right upper arm and forearm to light touch.    --VASCULAR: Warm upper and lower limbs bilaterally.     --CERVICAL SPINE: Inspection reveals no evidence of deformity or swelling. Range of motion is mildly limited in cervical flexion, extension, lateral rotation. No point tenderness to palpation of cervical spine.  Positive tenderness to palpation of right trap.  Negative tenderness to palpation of left trap, scaps, or paraspinal musculature.      RESULTS:   Prior medical records from [facility] and Care Everywhere were reviewed today.         IMAGING:  Spine imaging was personally reviewed and interpreted today. The images were shown to the patient and the findings were explained using a spine model.     XR Cervical Spine 2/3 Views    Result Date: 8/28/2023  XR CERVICAL SPINE TWO-THREE VIEWS   8/28/2023 3:06 PM COMPARISON: None HISTORY: Neck pain; Radicular pain in right arm     IMPRESSION: There is normal alignment of the cervical vertebrae; however, there is straightening of normal cervical lordosis. Vertebral body heights of the cervical spine are normal. Craniocervical alignment is  normal. There is no evidence for fracture of the cervical spine. JAXSON ASH MD   SYSTEM ID:  [id number]         Norma Sanchez P-C  [facility]  O. [phone]           Again, thank you for allowing me to participate in the care of your patient.        Sincerely,        RICHIE Robb CNP

## 2023-09-15 NOTE — PROGRESS NOTES
ASSESSMENT: Balbina Donohue is a 63 year old female presents for consultation at the request of PCP No Ref-Primary, Physician, who presents today for new patient evaluation of :     -Cervical radiculopathy    Patient has weakness in her right hand and arm.  Given her right arm pain and numbness, which started 1 month ago and has not improved with steroids, I would recommend an MRI cervical spine for treatment planning.  I will send as needed anti-inflammatory ibuprofen which she can take 3 times a day as needed.  I will also add gabapentin today.  We will consider treatment plan based on MRI results.  Given her weakness, if there is a concerning degree of nerve impingement, I would have a low threshold to refer to neurosurgery.  If disc or neuroforaminal narrowing appears mild to moderate, I would likely order physical therapy, consider other medication options, and/or a cervical spine epidural steroid injection.  She will call to discuss MRI results we will decide plan        9/9/2023     3:05 PM   OSWESTRY DISABILITY INDEX   Count 10   Sum 5   Oswestry Score (%) 10 %            Diagnoses and all orders for this visit:  Neck pain  -     Orthopedic  Referral  Radicular pain in right arm  -     Orthopedic  Referral  -     MR Cervical Spine w/o Contrast; Future  -     gabapentin (NEURONTIN) 300 MG capsule; 300mg at bedtime x 3 days, twice daily x 3 days, then three times daily x 3 days. Increase to 600mg TID per titration schedule.  -     ibuprofen (ADVIL/MOTRIN) 600 MG tablet; Take 1 tablet (600 mg) by mouth every 8 hours as needed for moderate pain Take with food. Stop if blood in stool.       PLAN:  Reviewed spine anatomy and disease process. Discussed diagnosis and treatment options with the patient today. A shared decision making model was used. The patient's values and choices were respected. The following represents what was discussed and decided upon by the provider and the patient.      -DIAGNOSTIC TESTS:  Images were personally reviewed and interpreted and explained to patient today using spine model.   -- MRI cervical spine without contrast    -PHYSICAL THERAPY:    -Would consider starting physical therapy depending on results  Discussed the importance of core strengthening, ROM, stretching exercises with the patient and how each of these entities is important in decreasing pain.  Explained to the patient that the purpose of physical therapy is to teach the patient a home exercise program.  These exercises need to be performed every day in order to decrease pain and prevent future occurrences of pain.    -MEDICATIONS:    -I added ibuprofen 600 mg to take 3 times per day as needed for pain control.  -I added gabapentin to be uptitrated gradually per instructions  -She has already taken a Medrol Dosepak without any relief  Discussed multiple medication options today with patient. Discussed risks, side effects, and proper use of medications. Patient verbalized understanding.    -INTERVENTIONS:    Discussed the role for cervical epidural steroid injections and pain control.  Would need more of a risk and benefits discussion if this were to be pursued in the future, depending on imaging results.  She would be a good candidate, if imaging results correlated with her right arm symptoms    -PATIENT EDUCATION: Total time of 30 minutes, on the day of service, spent with the patient, reviewing the chart, placing orders, and documenting.   -Today we also discussed the issues related to the pros and cons of the current treatment plan.    -FOLLOW-UP: Call for MRI results and we will decide plan    Advised patient to call the Spine Center if symptoms worsen or if they develop red flag symptoms such as numbness, weakness, severe pain uncontrolled by current pain med regimen, or any new or worsening problems controlling bladder and bowel function.    ______________________________________________________________________    SUBJECTIVE:   Balbina Donohue  is a 63 year old female history of rheumatoid arthritis who presents today for new patient evaluation of neck pain radiating into her right arm    Patient describes neck pain at an 8 out of 10 radiating into her right upper arm and forearm.  Symptoms began 1 month ago without injury, she woke up with this in the morning.  Felt like she slept wrong.  She describes numbness and tingling in the same distribution, with weakness of the arm and hand.  Pain at best is a 6 out of 10, improves somewhat with massage of the arm.  Activity notably makes her pain worse.  Describes it as numbness, sharp.  She denies any left arm symptoms, imbalance, loss of bowel or bladder control, or leg symptoms.    She was given a prescription for Medrol Dosepak, which she took as directed and experienced no relief of symptoms.  She has not done physical therapy, had any previous epidural steroid injections, or any cervical spine surgeries.  She is here with her son today, who is translating at her request    -Treatment to Date:     -Medications:  -Medrol Dosepak    Current Outpatient Medications   Medication    cyanocobalamin (VITAMIN B-12) 1000 MCG tablet    gabapentin (NEURONTIN) 300 MG capsule    hydroxychloroquine (PLAQUENIL) 200 MG tablet    ibuprofen (ADVIL/MOTRIN) 600 MG tablet    NAPROXEN PO    ibuprofen (ADVIL/MOTRIN) 600 MG tablet    methylPREDNISolone (MEDROL DOSEPAK) 4 MG tablet therapy pack    methylPREDNISolone (MEDROL DOSEPAK) 4 MG tablet therapy pack    predniSONE (DELTASONE) 5 MG tablet    rifampin (RIFADIN) 300 MG capsule    sulfaSALAzine (AZULFIDINE) 500 MG tablet     No current facility-administered medications for this visit.       Allergies   Allergen Reactions    Prevacid [Lansoprazole] Rash       Past Medical History:   Diagnosis Date    Abnormal glandular Papanicolaou smear of cervix 3/00    per patient f/u paps  normal    ASCUS on Pap smear 12/18/09    Hpv-neg.    Esophageal stricture 6/21/10    benign esophageal stricture dilated at EGD    Normal delivery     2 childbirths, no complications with regnancies or delivery    Thyroid nodule 9/8/10    left thyroid lobectomy, benign degenerated colloid nodule per path        Patient Active Problem List   Diagnosis    Esophageal stricture    Thyroid nodule    Iron deficiency anemia    B12 deficiency anemia    Vitamin D deficiency    ASCUS on Pap smear       Past Surgical History:   Procedure Laterality Date    HC THYROID LOBECTOMY,UNILAT  9/8/10    Left thyroid lobectomy, benign thyroid nodule    [facility] UGI ENDOSCOPY, SIMPLE EXAM  11/17/05    normal EGD       Family History   Problem Relation Age of Onset    Unknown/Adopted Father     Hypertension Mother     Family History Negative Sister     Family History Negative Sister     Family History Negative Brother     Family History Negative Brother        Reviewed past medical, surgical, and family history with patient found on new patient intake packet located in EMR Media tab.     SOCIAL HX: Non-smoker, no alcohol use, no recreational drug use    Oswestry (GAMA) Questionnaire:        9/9/2023     3:05 PM   OSWESTRY DISABILITY INDEX   Count 10   Sum 5   Oswestry Score (%) 10 %       Neck Disability Index:      9/9/2023     3:06 PM   Neck Disability Index (  Malik H. and Rubina LAWTON. 1991. All rights reserved.; used with permission)   SECTION 1 - PAIN INTENSITY 0   SECTION 2 - PERSONAL CARE 0   SECTION 3 - LIFTING 0   SECTION 4 - READING 0   SECTION 5 - HEADACHES 0   SECTION 6 - CONCENTRATION 0   SECTION 7 - WORK 0   SECTION 8 - DRIVING 0   SECTION 9 - SLEEPING 0   SECTION 10 - RECREATION 0   Count 10   Sum 0   Raw Score: /50 0   Neck Disability Index Score: (%) 0 %          PHQ-2 Score:       9/15/2023     2:51 PM 11/8/2019    11:15 AM   PHQ-2 ( 1999 [employer])   Q1: Little interest or pleasure in doing things 0 0   Q2: Feeling down,  "depressed or hopeless 0 0   PHQ-2 Score 0 0   PHQ-2 Total Score (12-17 Years)- Positive if 3 or more points; Administer PHQ-A if positive  0          ROS: Positive for muscle pain. Specifically negative for bowel/bladder dysfunction, balance changes, headache, dizziness, foot drop, fevers, chills, appetite changes, nausea/vomiting, unexplained weight loss. Otherwise 13 systems reviewed are negative. Please see the patient's intake questionnaire from today for details.    Answers submitted by the patient for this visit:  Symptoms you have experienced in the last 30 days (Submitted on 9/9/2023)  General Symptoms: No  Skin Symptoms: No  HENT Symptoms: No  EYE SYMPTOMS: No  HEART SYMPTOMS: No  LUNG SYMPTOMS: No  INTESTINAL SYMPTOMS: No  URINARY SYMPTOMS: No  GYNECOLOGIC SYMPTOMS: No  BREAST SYMPTOMS: No  SKELETAL SYMPTOMS: No  BLOOD SYMPTOMS: No  NERVOUS SYSTEM SYMPTOMS: No  MENTAL HEALTH SYMPTOMS: No    OBJECTIVE:  BP (!) 152/90   Pulse 82   Ht 5' 2\" (1.575 m)   Wt 115 lb (52.2 kg)   LMP 10/06/2005   BMI 21.03 kg/m      PHYSICAL EXAMINATION:  --CONSTITUTIONAL: Vital signs as above. No acute distress. The patient is well nourished and well groomed.  --PSYCHIATRIC: The patient is awake, alert, oriented to person, place, and time, and answering questions appropriately with clear speech. Appropriate mood and affect   --HEENT: Sclera are non-injected. Extraocular muscles are intact. Moist oral mucosa.  --SKIN: Skin over the face, bilateral upper extremities, and posterior torso is clean, dry, intact without rashes.  --RESPIRATORY: Normal rhythm and effort. No abnormal accessory muscle breathing patterns noted.     --NEUROLOGIC: CN III-XII are grossly intact.   --GROSS MOTOR: Easily arises from a seated position. Toe walking, heel walking, and tandem gait are normal.      --UPPER EXTREMITY MOTOR TESTING:  Shoulder abduction: right 5/5, left 5/5  Triceps: right 5-/5 left 5/5  Biceps:right 5-/5  left 5/5,   Wrist flexion: " right 5/5  left 5/5,   Hand :  right 5-/5  left 5/5,   Intrinsics: right 4+/5  left 5/5,   Extensors: right 4+/5  left 5/5,     --LOWER EXTREMITY MOTOR TESTING  Hip flexion: right 5/5  left 5/5,   Quads:right 5/5  left 5/5,   Hamstrings: right 5/5  left 5/5,   Dorsiflexion: right 5/5  left 5/5,   Plantarflexion: right 5/5  left 5/5,   EHL: right 5/5  left 5/5,     REFLEXES: 2/4 symmetric triceps, biceps, brachioradialis bilaterally. 2/4 symmetric patellar, achilles reflex bilaterally.  Negative Clonus, Babinski, and Tijerina's bilaterally.      SENSATION: of the upper and lower extremities is decreased in the right upper arm and forearm to light touch.    --VASCULAR: Warm upper and lower limbs bilaterally.     --CERVICAL SPINE: Inspection reveals no evidence of deformity or swelling. Range of motion is mildly limited in cervical flexion, extension, lateral rotation. No point tenderness to palpation of cervical spine.  Positive tenderness to palpation of right trap.  Negative tenderness to palpation of left trap, scaps, or paraspinal musculature.      RESULTS:   Prior medical records from [facility] and Care Everywhere were reviewed today.         IMAGING:  Spine imaging was personally reviewed and interpreted today. The images were shown to the patient and the findings were explained using a spine model.     XR Cervical Spine 2/3 Views    Result Date: 8/28/2023  XR CERVICAL SPINE TWO-THREE VIEWS   8/28/2023 3:06 PM COMPARISON: None HISTORY: Neck pain; Radicular pain in right arm     IMPRESSION: There is normal alignment of the cervical vertebrae; however, there is straightening of normal cervical lordosis. Vertebral body heights of the cervical spine are normal. Craniocervical alignment is normal. There is no evidence for fracture of the cervical spine. JAXSON ASH MD   SYSTEM ID:  [id number]         Norma TERRELL-C  [facility]  O. [phone]

## 2023-09-15 NOTE — PATIENT INSTRUCTIONS
Prescribed ibuprofen today. Please take as prescribed, as needed for pain control as well as to aid in decreasing inflammation.   Take medication with a full glass of water and with food.   *Do not take Advil, Ibuprofen, Aleve, or Naproxen while taking this medication as it can cause organ failure if taken together*  This medication does have risks if taken long-term, these risks include: gastrointestinal irritation, kidney dysfunction, and cardiovascular effects.  We will check your kidney function as needed while you're on this medication.  Stop taking this medication if you have intolerable side effects or blood in the stool.     Imaging (Xray, CT, or MRI) has been ordered today.   Radiology will call you to schedule. Please call below if you do not hear from them in the next couple of days.     [facility] Radiology Scheduling:  Please call [phone] to schedule your image(s) (select option #1).    There are 3 different locations:    [address] [facility] [place]  [address] [facility] [address]   [place] [address]     ~Please call our [facility] Nurse Navigation line [phone] with any questions or concerns about your treatment plan, if symptoms worsen and you would like to be seen urgently, or if you have any new or worsening numbness, weakness, or problems controlling bladder and bowel function.  ~You are also welcome to contact Norma Sanchez via Threadflip, but please be aware that responses to Threadflip message may take 2-3 days due to the high volume of patients seen in clinic.       Radicular Pain    Radicular pain in either the arm or leg is usually from a bulging disc in the spine. A portion of the herniated disc may press against the nerves as the nerves exit the spine. This causes pain which is felt down the arm or leg. Other causes of  "radicular pain may include:  Fractures.  Heart disease.  Cancer.  An abnormal and usually degenerative state of the nervous system or nerves (neuropathy).    In most cases, radicular pain is treated without imaging unless symptoms do not start to improve. If that is the case, your provider may order a CT or MRI scan to determine the cause.     Nerves in the cervical spine (neck) may cause radicular pain into the outer shoulder and down the arm. It can spread down to the thumb and fingers. The symptoms vary depending on which nerve root has been affected. In most cases, radicular pain improves with conservative treatment such as physical therapy, cervical traction, medications, and epidural steroid injections. A program for neck rehabilitation with stretching and strengthening exercises is an important part of management. Treatment may take months, and surgery may be considered as a last resort if the symptoms do not improve.    Nerves in the lumbar spine (lower back) may cause radicular pain into the hip and down the leg. The commonly used term for this type of pain is \"sciatica\". Conservative treatment is also recommended for this problem. Most patients feel better after 2 to 4 weeks of rest and other supportive care. You should avoid bending, lifting, and all other activities which can make your pain worse. Physical therapy, traction, medications, and epidural steroid injections can be good options to help with your recovery. A program for back injury rehabilitation with stretching and strengthening exercises is an important part of management. Surgery may be considered as a last resort if symptoms do not improve with conservative treatment.     You may take over-the-counter or prescription medicines for pain, discomfort, or fever as directed by your caregiver. Muscle relaxants may help by relieving more severe pain and spasm. Neuropathic medication (such as gabapentin, lyrica, or cymbalta) can help decrease your " symptoms of nerve pain as well. Cold or massage can also give significant relief. Spinal manipulation is not recommended as it can increase the degree of disc protrusion. We do not recommend taking narcotic medication such as percocet, oxycodone, norco, dilaudid, or others unless pain is severe and not controlled with any other oral options.    Epidural steroid injections are often effective treatment for radicular pain. These injections deliver strong anti-inflammatory medicine to the area directly around the nerve root in the space between your back bones (vertebrae). Your provider can give you more information about steroid injections. These injections are most effective when given within two weeks of the onset of acute pain. You should see your provider for follow up care as recommended.     In most cases, radicular pain is treated without imaging unless symptoms do not start to improve. If that is the case, your provider may order a CT or MRI scan to determine the cause.     SEEK IMMEDIATE MEDICAL CARE IF:  You develop increased pain, weakness, or numbness in your arm or leg.  You develop difficulty with bladder or bowel control.  You develop abdominal pain.    Document Released: 01/25/2006 Document Revised: 03/11/2013 Document Reviewed: 04/12/2010  Patient Information  2013 [employer].

## 2023-09-25 ENCOUNTER — TRANSFERRED RECORDS (OUTPATIENT)
Dept: HEALTH INFORMATION MANAGEMENT | Facility: CLINIC | Age: 63
End: 2023-09-25
Payer: COMMERCIAL

## 2023-09-25 LAB
ALT SERPL-CCNC: 19 IU/L (ref 5–35)
AST SERPL-CCNC: 33 U/L (ref 5–34)
CREATININE (EXTERNAL): 0.62 MG/DL (ref 0.5–1.3)
GFR ESTIMATED (EXTERNAL): 103.7 ML/MIN/1.73M2

## 2023-09-26 ENCOUNTER — TRANSCRIBE ORDERS (OUTPATIENT)
Dept: OTHER | Age: 63
End: 2023-09-26

## 2023-09-26 DIAGNOSIS — M25.511 RIGHT SHOULDER PAIN: Primary | ICD-10-CM

## 2023-10-02 ENCOUNTER — ANCILLARY PROCEDURE (OUTPATIENT)
Dept: MRI IMAGING | Facility: CLINIC | Age: 63
End: 2023-10-02
Attending: NURSE PRACTITIONER
Payer: COMMERCIAL

## 2023-10-02 DIAGNOSIS — M79.2 RADICULAR PAIN IN RIGHT ARM: ICD-10-CM

## 2023-10-02 PROCEDURE — 72141 MRI NECK SPINE W/O DYE: CPT

## 2023-10-03 ENCOUNTER — THERAPY VISIT (OUTPATIENT)
Dept: PHYSICAL THERAPY | Facility: CLINIC | Age: 63
End: 2023-10-03
Attending: INTERNAL MEDICINE
Payer: COMMERCIAL

## 2023-10-03 DIAGNOSIS — M54.12 CERVICAL RADICULOPATHY: Primary | ICD-10-CM

## 2023-10-03 PROCEDURE — 97161 PT EVAL LOW COMPLEX 20 MIN: CPT | Mod: GP | Performed by: PHYSICAL THERAPIST

## 2023-10-03 PROCEDURE — 97110 THERAPEUTIC EXERCISES: CPT | Mod: GP | Performed by: PHYSICAL THERAPIST

## 2023-10-03 NOTE — PROGRESS NOTES
PHYSICAL THERAPY EVALUATION  Type of Visit: Evaluation    See electronic medical record for Abuse and Falls Screening details.    Subjective       Presenting condition or subjective complaint: Pt presents with complaints of R neck/scapular/UE sx's. Pt reported onset of numbness in the R UE in February of 2023. Pt reported she went to  and was prescribed an oral steroid. Pt reported sx's resolved with use of meds until re-occurence in August of 2023. Pt reported addition of neck/scapular pain in addition to the numbness of the R UE. Pt reported sx's are constant. 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  Date of onset:      Relevant medical history: Arthritis; Menopause; Rheumatoid arthritis   Dates & types of surgery:      Prior diagnostic imaging/testing results:       Prior therapy history for the same diagnosis, illness or injury: No      Prior Level of Function  Transfers: Independent  Ambulation: Independent  ADL: Independent  IADL: Housekeeping, Laundry, Meal preparation, Work    Living Environment  Social support: With family members   Type of home: House; Multi-level   Stairs to enter the home: Yes 2     Ramp: No   Stairs inside the home: Yes 0 (2 sets of 6 stairs each) Is there a railing: Yes   Help at home: None  Equipment owned:       Employment: Yes   Hobbies/Interests:      Patient goals for therapy: Resolve sx's.    Pain assessment:      Objective   CERVICAL SPINE EVALUATION  PAIN: Pain Level at Rest: 2/10  Pain Location: R neck/scapula/UE (upper arm and forearm)  Pain Frequency: constant  Pain is Exacerbated By: movements of the head; sustained flexion  POSTURE: WFL  ROM:  CROM: flexion: WFL's, provocation of R scapular pain; R rotation: WNL's, provocation of R scapular pain; L rotation: WNL's, R SB: WNL's, L SB: WNL's with mild provocation of R scapular sx's, extension: moderate loss, provocation of R neck/scapular  sx's.  MYOTOMES: WNL  DTR S: WNL  DERMATOMES: WNL    Assessment & Plan   CLINICAL IMPRESSIONS  Medical Diagnosis: Right shoulder pain    Treatment Diagnosis:     Impression/Assessment: Patient is a 63 year old female with R neck/scapular and UE complaints.  The following significant findings have been identified: Pain, Decreased ROM/flexibility, and Decreased activity tolerance. These impairments interfere with their ability to perform work tasks, household chores, and sleeping  as compared to previous level of function.     Clinical Decision Making (Complexity):  Clinical Presentation: Stable/Uncomplicated  Clinical Presentation Rationale: based on medical and personal factors listed in PT evaluation  Clinical Decision Making (Complexity): Low complexity    PLAN OF CARE  Treatment Interventions:  Interventions: Manual Therapy, Neuromuscular Re-education, Therapeutic Activity, Therapeutic Exercise, Self-Care/Home Management    Long Term Goals            Frequency of Treatment:    Duration of Treatment:      Education Assessment:        Risks and benefits of evaluation/treatment have been explained.   Patient/Family/caregiver agrees with Plan of Care.     Evaluation Time:           Signing Clinician: Mylene Washington, PT

## 2023-10-05 ENCOUNTER — TELEPHONE (OUTPATIENT)
Dept: PHYSICAL MEDICINE AND REHAB | Facility: CLINIC | Age: 63
End: 2023-10-05
Payer: COMMERCIAL

## 2023-10-05 NOTE — TELEPHONE ENCOUNTER
----- Message from RICHIE Kirby CNP sent at 10/4/2023  1:05 PM CDT -----  Please let Balbina know that her cervical MRI shows a disc herniation on the right at C6-7 which is causing narrowing around the nerve root on the right side. This likely explains her right arm pain and mild weakness in the hand. If her pain has not improved with gabapentin and ibuprofen, I would recommend a C7-T1 IL RODRICK for pain control. If her pain and weakness gets worse despite injection and PT, I would recommend a neurosurgery referral with cervical flex ex xr

## 2023-10-05 NOTE — TELEPHONE ENCOUNTER
Call placed to patient with provider's results and recommendations with assistance of [employer] . Left message to return call.

## 2023-10-13 ENCOUNTER — THERAPY VISIT (OUTPATIENT)
Dept: PHYSICAL THERAPY | Facility: CLINIC | Age: 63
End: 2023-10-13
Payer: COMMERCIAL

## 2023-10-13 DIAGNOSIS — M54.12 CERVICAL RADICULOPATHY: Primary | ICD-10-CM

## 2023-10-13 PROCEDURE — 97140 MANUAL THERAPY 1/> REGIONS: CPT | Mod: GP | Performed by: PHYSICAL THERAPIST

## 2023-10-13 PROCEDURE — 97110 THERAPEUTIC EXERCISES: CPT | Mod: GP | Performed by: PHYSICAL THERAPIST

## 2023-10-19 ENCOUNTER — THERAPY VISIT (OUTPATIENT)
Dept: PHYSICAL THERAPY | Facility: CLINIC | Age: 63
End: 2023-10-19
Payer: COMMERCIAL

## 2023-10-19 DIAGNOSIS — M54.12 CERVICAL RADICULOPATHY: Primary | ICD-10-CM

## 2023-10-19 PROCEDURE — 97140 MANUAL THERAPY 1/> REGIONS: CPT | Mod: GP | Performed by: PHYSICAL THERAPIST

## 2023-10-19 PROCEDURE — 97110 THERAPEUTIC EXERCISES: CPT | Mod: GP | Performed by: PHYSICAL THERAPIST

## 2023-10-27 ENCOUNTER — OFFICE VISIT (OUTPATIENT)
Dept: INTERNAL MEDICINE | Facility: CLINIC | Age: 63
End: 2023-10-27
Payer: COMMERCIAL

## 2023-10-27 VITALS
OXYGEN SATURATION: 98 % | WEIGHT: 120.3 LBS | BODY MASS INDEX: 22.14 KG/M2 | HEIGHT: 62 IN | HEART RATE: 79 BPM | SYSTOLIC BLOOD PRESSURE: 145 MMHG | TEMPERATURE: 97.8 F | DIASTOLIC BLOOD PRESSURE: 93 MMHG

## 2023-10-27 DIAGNOSIS — Z13.220 SCREENING FOR LIPID DISORDERS: ICD-10-CM

## 2023-10-27 DIAGNOSIS — Z12.31 VISIT FOR SCREENING MAMMOGRAM: ICD-10-CM

## 2023-10-27 DIAGNOSIS — Z12.4 CERVICAL CANCER SCREENING: ICD-10-CM

## 2023-10-27 DIAGNOSIS — R03.0 ELEVATED BLOOD-PRESSURE READING WITHOUT DIAGNOSIS OF HYPERTENSION: ICD-10-CM

## 2023-10-27 DIAGNOSIS — R21 RASH OF FACE: ICD-10-CM

## 2023-10-27 DIAGNOSIS — Z00.00 ROUTINE GENERAL MEDICAL EXAMINATION AT A HEALTH CARE FACILITY: ICD-10-CM

## 2023-10-27 DIAGNOSIS — Z12.11 SCREEN FOR COLON CANCER: Primary | ICD-10-CM

## 2023-10-27 DIAGNOSIS — M06.9 RHEUMATOID ARTHRITIS INVOLVING MULTIPLE SITES, UNSPECIFIED WHETHER RHEUMATOID FACTOR PRESENT (H): ICD-10-CM

## 2023-10-27 PROBLEM — E53.8 VITAMIN B12 DEFICIENCY: Status: ACTIVE | Noted: 2021-05-14

## 2023-10-27 PROCEDURE — 99396 PREV VISIT EST AGE 40-64: CPT | Mod: 25 | Performed by: INTERNAL MEDICINE

## 2023-10-27 PROCEDURE — 90715 TDAP VACCINE 7 YRS/> IM: CPT | Performed by: INTERNAL MEDICINE

## 2023-10-27 PROCEDURE — 87624 HPV HI-RISK TYP POOLED RSLT: CPT | Performed by: INTERNAL MEDICINE

## 2023-10-27 PROCEDURE — G0145 SCR C/V CYTO,THINLAYER,RESCR: HCPCS | Performed by: INTERNAL MEDICINE

## 2023-10-27 PROCEDURE — 90471 IMMUNIZATION ADMIN: CPT | Performed by: INTERNAL MEDICINE

## 2023-10-27 RX ORDER — TRETINOIN 0.25 MG/G
CREAM TOPICAL AT BEDTIME
Qty: 45 G | Refills: 0 | Status: SHIPPED | OUTPATIENT
Start: 2023-10-27

## 2023-10-27 RX ORDER — AZELAIC ACID 0.15 G/G
GEL TOPICAL
Qty: 50 G | Refills: 0 | Status: SHIPPED | OUTPATIENT
Start: 2023-10-27

## 2023-10-27 NOTE — PATIENT INSTRUCTIONS
You got tetanus shot today.    Please get Flu shot, COVID shot, shingles vaccine at your pharmacy.  Preventive Health Recommendations  Female Ages 50 - 64    Yearly exam: See your health care provider every year in order to  Review health changes.   Discuss preventive care.    Review your medicines if your doctor has prescribed any.    Get a Pap test every three years (unless you have an abnormal result and your provider advises testing more often).  If you get Pap tests with HPV test, you only need to test every 5 years, unless you have an abnormal result.   You do not need a Pap test if your uterus was removed (hysterectomy) and you have not had cancer.  You should be tested each year for STDs (sexually transmitted diseases) if you're at risk.   Have a mammogram every 1 to 2 years.  Have a colonoscopy at age 45, or have a yearly FIT test (stool test). These exams screen for colon cancer.    Have a cholesterol test every 5 years, or more often if advised.  Have a diabetes test (fasting glucose) every three years. If you are at risk for diabetes, you should have this test more often.   If you are at risk for osteoporosis (brittle bone disease), think about having a bone density scan (DEXA).    Shots: Get a flu shot each year. Get a tetanus shot every 10 years.    Nutrition:   Eat at least 5 servings of fruits and vegetables each day.  Eat whole-grain bread, whole-wheat pasta and brown rice instead of white grains and rice.  Get adequate Calcium and Vitamin D.     Lifestyle  Exercise at least 150 minutes a week (30 minutes a day, 5 days a week). This will help you control your weight and prevent disease.  Limit alcohol to one drink per day.  No smoking.   Wear sunscreen to prevent skin cancer.   See your dentist every six months for an exam and cleaning.  See your eye doctor every 1 to 2 years.

## 2023-10-27 NOTE — PROGRESS NOTES
SUBJECTIVE:   CC: Balbina is an 63 year old who presents for preventive health visit.     Pap   HPI    Here for annual physical.    Complaining of a rash on her face.  Has had it for some time.  Used tretinoin cream and azelaic acid cream with some relief, wants refills today      Has rheumatoid arthritis.Sees  Rheumatology at the arthritis clinic.          Have you ever done Advance Care Planning? (For example, a Health Directive, POLST, or a discussion with a medical provider or your loved ones about your wishes): No, advance care planning information given to patient to review.  Patient declined advance care planning discussion at this time.    Social History     Tobacco Use    Smoking status: Never    Smokeless tobacco: Never   Substance Use Topics    Alcohol use: No           Reviewed orders with patient.  Reviewed health maintenance and updated orders accordingly - Yes      Breast Cancer Screening:    FHS-7:        No data to display                Mammogram Screening: Recommended mammography every 1-2 years with patient discussion and risk factor consideration  Pertinent mammograms are reviewed under the imaging tab.    History of abnormal Pap smear: NO - age 30-65 PAP every 5 years with negative HPV co-testing recommended      8/5/2011     3:38 PM 12/18/2009    12:00 AM 9/5/2007    12:00 AM   PAP / HPV   PAP (Historical) ASC-US  ASC-US  NIL      Reviewed and updated as needed this visit by clinical staff   Tobacco  Allergies  Meds              Reviewed and updated as needed this visit by Provider                     Review of Systems  CONSTITUTIONAL: NEGATIVE for fever, chills, change in weight  INTEGUMENTARY/SKIN: NEGATIVE for worrisome rashes, moles or lesions  EYES: NEGATIVE for vision changes or irritation  ENT: NEGATIVE for ear, mouth and throat problems  RESP: NEGATIVE for significant cough or SOB  BREAST: NEGATIVE for masses, tenderness or discharge  CV: NEGATIVE for chest pain, palpitations or  "peripheral edema  GI: NEGATIVE for nausea, abdominal pain, heartburn, or change in bowel habits  : NEGATIVE for unusual urinary or vaginal symptoms. No vaginal bleeding.  MUSCULOSKELETAL: NEGATIVE for significant arthralgias or myalgia  NEURO: NEGATIVE for weakness, dizziness or paresthesias  PSYCHIATRIC: NEGATIVE for changes in mood or affect      OBJECTIVE:   BP (!) 145/93   Pulse 79   Temp 97.8  F (36.6  C) (Temporal)   Ht 1.575 m (5' 2\")   Wt 54.6 kg (120 lb 4.8 oz)   LMP 10/06/2005   SpO2 98%   BMI 22.00 kg/m    Physical Exam  GENERAL: healthy, alert and no distress  EYES: Eyes grossly normal to inspection, PERRL and conjunctivae and sclerae normal  HENT: ear canals and TM's normal, nose and mouth without ulcers or lesions  NECK: no adenopathy, no asymmetry, masses, or scars and thyroid normal to palpation  RESP: lungs clear to auscultation - no rales, rhonchi or wheezes  CV: regular rate and rhythm, normal S1 S2, no S3 or S4, no murmur, click or rub, no peripheral edema and peripheral pulses strong  ABDOMEN: soft, nontender, no hepatosplenomegaly, no masses and bowel sounds normal  Pap smear done  MS: no gross musculoskeletal defects noted, no edema  SKIN: Dark discoloration on both cheeks and forehead  NEURO: Normal strength and tone, mentation intact and speech normal  PSYCH: mentation appears normal, affect normal/bright    Diagnostic Test Results:  Labs reviewed in Epic    ASSESSMENT/PLAN:   Balbina was seen today for physical.    Diagnoses and all orders for this visit:      Annual physical exam  Screen for colon cancer  -     Fecal colorectal cancer screen FIT - Future (S+30); Future    Visit for screening mammogram  -     MA SCREENING DIGITAL BILAT - Future  (s+30); Future    Cervical cancer screening  -     Pap Screen with HPV - recommended age 30 - 65 years    Screening for lipid disorders  -     Cancel: Lipid panel reflex to direct LDL Non-fasting; Future    Rash of face             Dark " discoloration on her face like melasma  -     azelaic acid (FINACIA) 15 % external gel; Apply to face at night  -     tretinoin (RETIN-A) 0.025 % external cream; Apply topically at bedtime Apply to face one time a day  -     Refer to dermatology    Elevated blood-pressure reading without diagnosis of hypertension  Monitor home blood pressure    Rheumatoid arthritis  Stable, continue rheumatology follow-up    Other orders  -     REVIEW OF HEALTH MAINTENANCE PROTOCOL ORDERS  -     TDAP 10-64Y (ADACEL,BOOSTRIX)        Patient has been advised of split billing requirements and indicates understanding: Yes      COUNSELING:  Reviewed preventive health counseling, as reflected in patient instructions        She reports that she has never smoked. She has never used smokeless tobacco.          Rosa Maria Patel MD  [facility]

## 2023-10-30 ENCOUNTER — THERAPY VISIT (OUTPATIENT)
Dept: PHYSICAL THERAPY | Facility: CLINIC | Age: 63
End: 2023-10-30
Payer: COMMERCIAL

## 2023-10-30 DIAGNOSIS — M54.12 CERVICAL RADICULOPATHY: Primary | ICD-10-CM

## 2023-10-30 PROCEDURE — 97140 MANUAL THERAPY 1/> REGIONS: CPT | Mod: GP | Performed by: PHYSICAL THERAPIST

## 2023-10-30 PROCEDURE — 97110 THERAPEUTIC EXERCISES: CPT | Mod: GP | Performed by: PHYSICAL THERAPIST

## 2023-11-02 LAB
BKR LAB AP GYN ADEQUACY: NORMAL
BKR LAB AP GYN INTERPRETATION: NORMAL
BKR LAB AP HPV REFLEX: NORMAL
BKR LAB AP PREVIOUS ABNORMAL: NORMAL
PATH REPORT.COMMENTS IMP SPEC: NORMAL
PATH REPORT.COMMENTS IMP SPEC: NORMAL
PATH REPORT.RELEVANT HX SPEC: NORMAL

## 2023-11-06 ENCOUNTER — THERAPY VISIT (OUTPATIENT)
Dept: PHYSICAL THERAPY | Facility: CLINIC | Age: 63
End: 2023-11-06
Payer: COMMERCIAL

## 2023-11-06 DIAGNOSIS — M54.12 CERVICAL RADICULOPATHY: Primary | ICD-10-CM

## 2023-11-06 LAB
HUMAN PAPILLOMA VIRUS 16 DNA: NEGATIVE
HUMAN PAPILLOMA VIRUS 18 DNA: NEGATIVE
HUMAN PAPILLOMA VIRUS FINAL DIAGNOSIS: NORMAL
HUMAN PAPILLOMA VIRUS OTHER HR: NEGATIVE

## 2023-11-06 PROCEDURE — 97140 MANUAL THERAPY 1/> REGIONS: CPT | Mod: GP | Performed by: PHYSICAL THERAPIST

## 2023-11-06 PROCEDURE — 97110 THERAPEUTIC EXERCISES: CPT | Mod: GP | Performed by: PHYSICAL THERAPIST

## 2023-11-10 ENCOUNTER — PATIENT OUTREACH (OUTPATIENT)
Dept: INTERNAL MEDICINE | Facility: CLINIC | Age: 63
End: 2023-11-10
Payer: COMMERCIAL

## 2023-11-10 DIAGNOSIS — R87.610 ASCUS OF CERVIX WITH NEGATIVE HIGH RISK HPV: Primary | ICD-10-CM

## 2023-11-13 ENCOUNTER — THERAPY VISIT (OUTPATIENT)
Dept: PHYSICAL THERAPY | Facility: CLINIC | Age: 63
End: 2023-11-13
Payer: COMMERCIAL

## 2023-11-13 DIAGNOSIS — M54.12 CERVICAL RADICULOPATHY: Primary | ICD-10-CM

## 2023-11-13 PROCEDURE — 97110 THERAPEUTIC EXERCISES: CPT | Mod: GP | Performed by: PHYSICAL THERAPIST

## 2023-11-13 PROCEDURE — 97140 MANUAL THERAPY 1/> REGIONS: CPT | Mod: GP | Performed by: PHYSICAL THERAPIST

## 2024-01-13 ENCOUNTER — HEALTH MAINTENANCE LETTER (OUTPATIENT)
Age: 64
End: 2024-01-13

## 2024-02-12 ENCOUNTER — OFFICE VISIT (OUTPATIENT)
Dept: URGENT CARE | Facility: URGENT CARE | Age: 64
End: 2024-02-12
Payer: COMMERCIAL

## 2024-02-12 VITALS
HEART RATE: 89 BPM | RESPIRATION RATE: 16 BRPM | TEMPERATURE: 100.1 F | DIASTOLIC BLOOD PRESSURE: 90 MMHG | OXYGEN SATURATION: 98 % | SYSTOLIC BLOOD PRESSURE: 150 MMHG

## 2024-02-12 DIAGNOSIS — J01.90 ACUTE SINUSITIS WITH COEXISTING CONDITION REQUIRING PROPHYLACTIC TREATMENT: Primary | ICD-10-CM

## 2024-02-12 DIAGNOSIS — R05.9 COUGH, UNSPECIFIED TYPE: ICD-10-CM

## 2024-02-12 PROCEDURE — 99213 OFFICE O/P EST LOW 20 MIN: CPT | Performed by: FAMILY MEDICINE

## 2024-02-12 RX ORDER — DOXYCYCLINE 100 MG/1
100 TABLET ORAL 2 TIMES DAILY
Qty: 14 TABLET | Refills: 0 | Status: SHIPPED | OUTPATIENT
Start: 2024-02-12 | End: 2024-02-19

## 2024-02-12 RX ORDER — CHOLECALCIFEROL (VITAMIN D3) 50 MCG
2 TABLET ORAL
COMMUNITY
Start: 2023-12-16

## 2024-02-12 RX ORDER — BENZONATATE 200 MG/1
200 CAPSULE ORAL 3 TIMES DAILY PRN
Qty: 21 CAPSULE | Refills: 0 | Status: SHIPPED | OUTPATIENT
Start: 2024-02-12 | End: 2024-02-19

## 2024-02-12 NOTE — PROGRESS NOTES
SUBJECTIVE: Balbina Donohue is a 64 year old female here with concerns about sinus infection.  She states onset  of symptoms were greater than 10 days with sinus pressure and drainage.  Course of illness is worsening.    Severity: moderate  Current and Associated symptoms: cold symptoms  Predisposing factors include none.   Recent treatment has included: OTC's      Past Medical History:   Diagnosis Date    Abnormal glandular Papanicolaou smear of cervix 3/00    per patient f/u paps normal    ASCUS on Pap smear 12/18/09    Hpv-neg.    Esophageal stricture 6/21/10    benign esophageal stricture dilated at EGD    Normal delivery     2 childbirths, no complications with regnancies or delivery    Thyroid nodule 9/8/10    left thyroid lobectomy, benign degenerated colloid nodule per path     Allergies   Allergen Reactions    Prevacid [Lansoprazole] Rash     Social History     Tobacco Use    Smoking status: Never    Smokeless tobacco: Never   Substance Use Topics    Alcohol use: No       ROS:   no rash  no vomiting    OBJECTIVE:  BP (!) 150/90   Pulse 89   Temp 100.1  F (37.8  C) (Tympanic)   Resp 16   LMP 10/06/2005   SpO2 98%   NAD  EYES: clear, no mattering  EARS: TM's clear, no redness/buldging, canals clear, no swelling/redness  NOSE: discolored discharge shana. Nares  THROAT: clear, no erythema, no exudate  SINUS: maxillary tenderness with palpation  LUNGS: CTAB, no rhonchi/wheeze/rales      ICD-10-CM    1. Acute sinusitis with coexisting condition requiring prophylactic treatment  J01.90 doxycycline monohydrate (ADOXA) 100 MG tablet      2. Cough, unspecified type  R05.9 benzonatate (TESSALON) 200 MG capsule          Follow up with primary clinic if not improving

## 2024-02-13 ENCOUNTER — ANCILLARY PROCEDURE (OUTPATIENT)
Dept: GENERAL RADIOLOGY | Facility: CLINIC | Age: 64
End: 2024-02-13
Attending: PHYSICIAN ASSISTANT
Payer: COMMERCIAL

## 2024-02-13 ENCOUNTER — OFFICE VISIT (OUTPATIENT)
Dept: URGENT CARE | Facility: URGENT CARE | Age: 64
End: 2024-02-13
Payer: COMMERCIAL

## 2024-02-13 VITALS
SYSTOLIC BLOOD PRESSURE: 157 MMHG | HEART RATE: 97 BPM | OXYGEN SATURATION: 98 % | DIASTOLIC BLOOD PRESSURE: 97 MMHG | TEMPERATURE: 100.4 F

## 2024-02-13 DIAGNOSIS — R06.2 NOCTURNAL COUGH WITH WHEEZE: ICD-10-CM

## 2024-02-13 DIAGNOSIS — R06.2 WHEEZING: ICD-10-CM

## 2024-02-13 DIAGNOSIS — J18.9 PNEUMONIA DUE TO INFECTIOUS ORGANISM, UNSPECIFIED LATERALITY, UNSPECIFIED PART OF LUNG: ICD-10-CM

## 2024-02-13 DIAGNOSIS — J98.01 BRONCHOSPASM: ICD-10-CM

## 2024-02-13 DIAGNOSIS — R05.8 NOCTURNAL COUGH WITH WHEEZE: ICD-10-CM

## 2024-02-13 DIAGNOSIS — R50.9 FEVER, UNSPECIFIED FEVER CAUSE: Primary | ICD-10-CM

## 2024-02-13 LAB
FLUAV AG SPEC QL IA: NEGATIVE
FLUBV AG SPEC QL IA: NEGATIVE

## 2024-02-13 PROCEDURE — 99214 OFFICE O/P EST MOD 30 MIN: CPT | Performed by: PHYSICIAN ASSISTANT

## 2024-02-13 PROCEDURE — 71046 X-RAY EXAM CHEST 2 VIEWS: CPT | Mod: TC | Performed by: STUDENT IN AN ORGANIZED HEALTH CARE EDUCATION/TRAINING PROGRAM

## 2024-02-13 PROCEDURE — 87804 INFLUENZA ASSAY W/OPTIC: CPT | Performed by: PHYSICIAN ASSISTANT

## 2024-02-13 RX ORDER — PREDNISONE 20 MG/1
20 TABLET ORAL 2 TIMES DAILY
Qty: 10 TABLET | Refills: 0 | Status: SHIPPED | OUTPATIENT
Start: 2024-02-13

## 2024-02-13 RX ORDER — ALBUTEROL SULFATE 90 UG/1
2 AEROSOL, METERED RESPIRATORY (INHALATION) EVERY 6 HOURS
Qty: 8.5 G | Refills: 0 | Status: SHIPPED | OUTPATIENT
Start: 2024-02-13

## 2024-02-13 RX ORDER — ACETAMINOPHEN 500 MG
500-1000 TABLET ORAL EVERY 6 HOURS PRN
Qty: 30 TABLET | Refills: 0 | Status: SHIPPED | OUTPATIENT
Start: 2024-02-13

## 2024-02-13 RX ORDER — CODEINE PHOSPHATE AND GUAIFENESIN 10; 100 MG/5ML; MG/5ML
1-2 SOLUTION ORAL
Qty: 118 ML | Refills: 0 | Status: SHIPPED | OUTPATIENT
Start: 2024-02-13

## 2024-02-13 NOTE — PROGRESS NOTES
Assessment & Plan     Fever, unspecified fever cause    A fever is a high body temperature and is the body's reaction to an illness. It's one way your body fights illness. A temperature of up to 102 F can be helpful, because it helps the body respond to infection. Most healthy people can have a fever as high as 103 F to 104 F for short periods of time without problems.  Its important to stay well hydrated with a fever and avoid being in the heat.  A fever can be treated with medications provided, but If symptoms worsen then be seen by your PCP or go to the .     - acetaminophen (TYLENOL) 500 MG tablet; Take 1-2 tablets (500-1,000 mg) by mouth every 6 hours as needed for mild pain    Bronchospasm    Chest xray Pos for infiltrated appearing to be pneumonia Daniel Campuzano [facility]    Start on medications  - Albuterol MDI  1-2 puff q 4 hrs   - predniSONE (DELTASONE) 20 MG tablet; Take 1 tablet (20 mg) by mouth 2 times daily    Wheezing    - Influenza A/B antigen  - XR Chest 2 Views; Future  - albuterol (PROVENTIL HFA) 108 (90 Base) MCG/ACT inhaler; Inhale 2 puffs into the lungs every 6 hours    Pneumonia due to infectious organism, unspecified laterality, unspecified part of lung    Pneumonia is an infection of the lungs. Most cases are caused by infections from bacteria or viruses.  Pneumonia may be mild or very severe. If it is caused by bacteria, you will be treated with antibiotics. It may take a few weeks to a few months to recover fully from pneumonia, depending on how sick you were and whether your overall health is good.  Follow-up care is a key part of your treatment and safety    Follow up with PCP in 2 weeks for repeat chest xray  - amoxicillin-clavulanate (AUGMENTIN) 875-125 MG tablet; Take 1 tablet by mouth 2 times daily    Nocturnal cough with wheeze    - guaiFENesin-codeine (ROBITUSSIN AC) 100-10 MG/5ML solution; Take 5-10 mLs by mouth nightly as needed for cough    Review of external notes as  documented elsewhere in note      Follow up with PCP in 2-3 weeks for repeat chest ray       At today's visit with Balbina Donohue , we discussed results, diagnosis, medications and formulated a plan.  We also discussed red flags for immediate return to clinic/ER, as well as indications for follow up with PCP if not improved in 3 days. Patient understood and agreed to plan. Balbina Donohue was discharged with stable vitals and has no further questions.       No follow-ups on file.    Subjective   Balbina is a 64 year old, presenting for the following health issues:  Urgent Care (Pt was seen yesterday states symptoms have worsened since yesterday after taking taking the medication )    HPI      Review of Systems  Constitutional, neuro, ENT, endocrine, pulmonary, cardiac, gastrointestinal, genitourinary, musculoskeletal, integument and psychiatric systems are negative, except as otherwise noted.      Objective    BP (!) 157/97   Pulse 97   Temp 100.4  F (38  C) (Tympanic)   LMP 10/06/2005   SpO2 98%   There is no height or weight on file to calculate BMI.  Physical Exam   GENERAL: alert and no distress  EYES: Eyes grossly normal to inspection, PERRL and conjunctivae and sclerae normal  HENT: ear canals and TM's normal, nose and mouth without ulcers or lesions  NECK: no adenopathy, no asymmetry, masses, or scars  RESP: rhonchi throughout and expiratory wheezes throughout  CV: regular rate and rhythm, normal S1 S2, no S3 or S4, no murmur, click or rub, no peripheral edema  MS: no gross musculoskeletal defects noted, no edema  SKIN: no suspicious lesions or rashes  NEURO: Normal strength and tone, mentation intact and speech normal  PSYCH: mentation appears normal, affect normal/bright    Xray - Reviewed and interpreted by me.  Pos for infiltrates Daniel Campuzano, JOAQUÍN [facility]          Signed Electronically by: JOAQUÍN Mcfarlane, PALianetC

## 2024-02-15 PROBLEM — M54.12 CERVICAL RADICULOPATHY: Status: RESOLVED | Noted: 2023-10-03 | Resolved: 2024-02-15

## 2024-02-15 NOTE — PROGRESS NOTES
DISCHARGE  Reason for Discharge: Pt completed 6 sessions of PT. Pt reported only minimal improvement in sx's throughout the course of PT. No additional visits scheduled. Pt discharged.      Discharge Plan: Patient to continue home program.   11/13/23 0500   Appointment Info   Signing clinician's name / credentials Mylene Washington PT   Total/Authorized Visits 6   Visits Used 6   Medical Diagnosis Right shoulder pain   PT Tx Diagnosis R cervical radiculopathy   Progress Note/Certification   Onset of illness/injury or Date of Surgery 09/25/23  (MD office visit)   Therapy Frequency 1x/week   Predicted Duration 6 weeks   Progress Note Completed Date 10/03/23       Present No   PT Goal 1   Goal Identifier Work Tolerance   Goal Description Tolerate 8 hours of work with pain level <2/10   Rationale   (To tolerate work tasks)   Target Date 11/14/23   Subjective Report   Subjective Report Reporting medial scapular pain overall less; noting more lateral scapular pain today.   Objective Measures   Objective Measures Objective Measure 1;Objective Measure 2;Objective Measure 3;Objective Measure 4   Objective Measure 1   Objective Measure palpation   Details Hypersensitivity R upper cervical spine.   Objective Measure 2   Objective Measure thoracic ROM   Objective Measure 3   Objective Measure CROM   Details Mild R sided neck pain with end range R rotation and cervical extension.   Objective Measure 4   Objective Measure PVIM   Details Hypomobility R C2-3.   Treatment Interventions (PT)   Interventions Therapeutic Procedure/Exercise;Neuromuscular Re-education;Manual Therapy   Therapeutic Procedure/Exercise   Therapeutic Procedures: strength, endurance, ROM, flexibillity minutes (63432) 10   Therapeutic Procedures Ther Proc 2   Ther Proc 1 Cervical retraction  (HEP)   Ther Proc 1 - Details Reviewed in sitting with VC, MC and VSC. Instructed to perform hourly at work. Stop if sx's in UE provoked with  "movments.   Ther Proc 2 Cervical extension   Ther Proc 2 - Details Reviewed supported extension with use of towel and with hands, continue 10 reps, 2x/day.   PTRx Ther Proc 2 Scapular Retraction/Depression  (not today)   PTRx Ther Proc 2 - Details 5x5\"   PTRx Ther Proc 3 [place] Stretch  (not today)   PTRx Ther Proc 3 - Details SL on Left, 2-3x30\"   PTRx Ther Proc 4 Shoulder Theraband Rows  (not today)   PTRx Ther Proc 4 - Details red TB x 20   PTRx Ther Proc 5 Shoulder Theraband Extension  (not today)   PTRx Ther Proc 5 - Details red TB x 20   Skilled Intervention Instructed in directional preferential movement to alleviated neck/UE related sx's.   Patient Response/Progress tried retraction with extension and no change in sxtoday   Neuromuscular Re-education   Neuromuscular Re-education Neuro Re-ed 2   Neuro Re-ed 1 Sleep posture   Neuro Re-ed 1 - Details Reviewed appropriate sleep options; discussed appropriate pillow height.   Neuro Re-ed 2 sitting posture   Neuro Re-ed 2 - Details home and work with lumbar roll   Skilled Intervention Instructed in strategies for improved sleep.   Manual Therapy   Manual Therapy: Mobilization, MFR, MLD, friction massage minutes (22066) 30   Manual Therapy Manual Therapy 2;Manual Therapy 3;Manual Therapy 4   Manual Therapy 1   (not today)   Manual Therapy 1 - Details 5x20 seconds; relief of sx's during traction   Manual Therapy 2 TPR, PAs thoracic spine  (not today)   Manual Therapy 2 - Details scapular mm   Manual Therapy 3 STM   Manual Therapy 3 - Details Supine-mid to upper cervical spine.   Manual Therapy 4 SB mob R C2-3, sitting, grade lV   Skilled Intervention Use of STM for muscle relaxation/pain relief.   Patient Response/Progress min change in sx today   Total Session Time   Timed Code Treatment Minutes 40   Total Treatment Time (sum of timed and untimed services) 40         Referring Provider:  Bryant Godinez    "

## 2024-02-28 ENCOUNTER — OFFICE VISIT (OUTPATIENT)
Dept: INTERNAL MEDICINE | Facility: CLINIC | Age: 64
End: 2024-02-28
Payer: COMMERCIAL

## 2024-02-28 VITALS
DIASTOLIC BLOOD PRESSURE: 82 MMHG | TEMPERATURE: 97.1 F | SYSTOLIC BLOOD PRESSURE: 125 MMHG | HEART RATE: 77 BPM | BODY MASS INDEX: 22.05 KG/M2 | WEIGHT: 119.8 LBS | OXYGEN SATURATION: 98 % | HEIGHT: 62 IN

## 2024-02-28 DIAGNOSIS — J18.9 PNEUMONIA OF BOTH LUNGS DUE TO INFECTIOUS ORGANISM, UNSPECIFIED PART OF LUNG: Primary | ICD-10-CM

## 2024-02-28 DIAGNOSIS — Z12.11 SCREEN FOR COLON CANCER: ICD-10-CM

## 2024-02-28 DIAGNOSIS — Z13.220 SCREENING FOR LIPID DISORDERS: ICD-10-CM

## 2024-02-28 DIAGNOSIS — R05.3 PERSISTENT COUGH FOR 3 WEEKS OR LONGER: ICD-10-CM

## 2024-02-28 PROCEDURE — 99213 OFFICE O/P EST LOW 20 MIN: CPT | Performed by: INTERNAL MEDICINE

## 2024-02-28 RX ORDER — FLUTICASONE PROPIONATE 50 MCG
1 SPRAY, SUSPENSION (ML) NASAL 2 TIMES DAILY
Qty: 16 G | Refills: 1 | Status: SHIPPED | OUTPATIENT
Start: 2024-02-28 | End: 2024-03-21

## 2024-02-28 RX ORDER — DEXTROMETHORPHAN HBR. AND GUAIFENESIN 10; 100 MG/5ML; MG/5ML
5 SOLUTION ORAL 4 TIMES DAILY PRN
Qty: 473 ML | Refills: 0 | Status: SHIPPED | OUTPATIENT
Start: 2024-02-28

## 2024-02-28 NOTE — PROGRESS NOTES
"  Assessment & Plan     Pneumonia of both lungs due to infectious organism, unspecified part of lung  Finished antibiotics, overall better  Repeat chest x-ray in 2 weeks from now, order placed    Persistent cough for 3 weeks or longer  Most likely postnasal drip syndrome  Flonase and Tussin  cough syrup prescribed                  MED REC REQUIRED  Post Medication Reconciliation Status:         Chanell Mortensen is a 64 year old, presenting for the following health issues:   Follow-Up    HPI   Needs a repeat chest x ray per AVS from      ED/ Followup:    Facility:  [facility]   Date of visit: 2-  Reason for visit: Fever-Pneumonia   Current Status: still have a cough and is fatigued. Looking at alternatives to the benzonatate     [place]  used for visit today.    Patient was diagnosed with pneumonia couple of weeks ago.  Went to urgent care and x-ray showed bilateral pneumonia.  She is feeling better but is complaining of persistent cough without any phlegm.  Occasional chest tightness and coughing.    Has some sore throat.    Tessalon Perles is not helping.        Review of Systems  Constitutional, HEENT, cardiovascular, pulmonary, GI, , musculoskeletal, neuro, skin, endocrine and psych systems are negative, except as otherwise noted.      Objective    /82   Pulse 77   Temp 97.1  F (36.2  C) (Temporal)   Ht 1.575 m (5' 2\")   Wt 54.3 kg (119 lb 12.8 oz)   LMP 10/06/2005   SpO2 98%   BMI 21.91 kg/m    Body mass index is 21.91 kg/m .  Physical Exam   GENERAL: alert and no distress  NECK: no adenopathy, no asymmetry, masses, or scars  RESP: lungs clear to auscultation - no rales, rhonchi or wheezes  CV: regular rate and rhythm, normal S1 S2, no S3 or S4, no murmur, click or rub, no peripheral edema  ABDOMEN: soft, nontender, no hepatosplenomegaly, no masses and bowel sounds normal  MS: no gross musculoskeletal defects noted, no edema            Signed Electronically by: " Rosa Maria Patel MD

## 2024-03-13 ENCOUNTER — ANCILLARY PROCEDURE (OUTPATIENT)
Dept: GENERAL RADIOLOGY | Facility: CLINIC | Age: 64
End: 2024-03-13
Attending: INTERNAL MEDICINE
Payer: COMMERCIAL

## 2024-03-13 DIAGNOSIS — J18.9 PNEUMONIA OF BOTH LUNGS DUE TO INFECTIOUS ORGANISM, UNSPECIFIED PART OF LUNG: ICD-10-CM

## 2024-03-13 PROCEDURE — 71046 X-RAY EXAM CHEST 2 VIEWS: CPT | Mod: TC | Performed by: RADIOLOGY

## 2024-03-21 DIAGNOSIS — R05.3 PERSISTENT COUGH FOR 3 WEEKS OR LONGER: ICD-10-CM

## 2024-03-21 RX ORDER — FLUTICASONE PROPIONATE 50 MCG
1 SPRAY, SUSPENSION (ML) NASAL 2 TIMES DAILY
Qty: 48 ML | Refills: 2 | Status: SHIPPED | OUTPATIENT
Start: 2024-03-21

## 2024-03-25 ENCOUNTER — TRANSFERRED RECORDS (OUTPATIENT)
Dept: HEALTH INFORMATION MANAGEMENT | Facility: CLINIC | Age: 64
End: 2024-03-25
Payer: COMMERCIAL

## 2024-03-25 LAB
ALT SERPL-CCNC: 22 IU/L (ref 5–35)
AST SERPL-CCNC: 33 U/L (ref 5–34)
CREATININE (EXTERNAL): 0.71 MG/DL (ref 0.5–1.3)

## 2024-03-27 NOTE — PROGRESS NOTES
Subjective     Balbina Donohue is a 60 year old female who presents to clinic today for the following health issues:    HPI     Patient is here for a one month follow up after positive Tb testing. She has been on rifampin for treatment. She is tolerating this well. She is taking this daily. She denies side effects.     Patient notes while going up stairs she notes a tightness and tingling in her face. This resolves with rest.     Reviewed and updated as needed this visit by Provider  Meds  Problems             Objective    /64   Pulse 77   Temp 98.4  F (36.9  C) (Oral)   Resp 16   Wt 52.9 kg (116 lb 9.6 oz)   LMP 10/06/2005   SpO2 99%   BMI 22.03 kg/m    Body mass index is 22.03 kg/m .  Physical Exam   GENERAL: healthy, alert and no distress  RESP: lungs clear to auscultation - no rales, rhonchi or wheezes  CV: regular rates and rhythm, normal S1 S2, no S3 or S4 and no murmur, click or rub  ABDOMEN: soft, nontender, no hepatosplenomegaly, no masses and bowel sounds normal      Diagnostic Test Results:  Labs reviewed in Epic        Assessment & Plan     1. TB lung, latent  - continue on current treatment recheck in 1 month   - Comprehensive metabolic panel    2. Calcium deficiency  - reviewed symptoms of face tingling with MD on site, slightly low calcium on labs, plan to replace calcium  - pt to follow up if symptoms worsen or fail to resolve         Return for Lab Work.    Mimi Kemp PA-C  [facility]     allow for swallow between intakes/alternate food with liquid/maintain upright posture during/after eating for 30 mins/oral hygiene/position upright (90 degrees)/small sips/bites

## 2024-06-14 ENCOUNTER — OFFICE VISIT (OUTPATIENT)
Dept: DERMATOLOGY | Facility: CLINIC | Age: 64
End: 2024-06-14
Attending: INTERNAL MEDICINE
Payer: COMMERCIAL

## 2024-06-14 DIAGNOSIS — L81.1 MELASMA: Primary | ICD-10-CM

## 2024-06-14 DIAGNOSIS — L81.4 LENTIGO: ICD-10-CM

## 2024-06-14 PROCEDURE — 99203 OFFICE O/P NEW LOW 30 MIN: CPT | Performed by: PHYSICIAN ASSISTANT

## 2024-06-14 ASSESSMENT — PAIN SCALES - GENERAL: PAINLEVEL: NO PAIN (0)

## 2024-06-14 NOTE — LETTER
6/14/2024      Balbina Donohue  [address] [facility] [address]      Dear Colleague,    Thank you for referring your patient, Balbina Donohue, to the [facility]. Please see a copy of my visit note below.    HPI:   Chief complaints: Balbina Donohue is a pleasant 64 year old female who presents for evaluation of facial discoloration. She has had this for a long time. She does wear sunscreen every day and will reapply if she is outside. She has tried an OTC cream but this was not helpful. No other spots of concern today      PHYSICAL EXAM:    LMP 10/06/2005   Breastfeeding No   Skin exam performed as follows: Type 3 skin. Mood appropriate  Alert and Oriented X 3. Well developed, well nourished in no distress.  General appearance: Normal  Head including face: Normal  Eyes: conjunctiva and lids: Normal  Mouth: Lips, teeth, gums: Normal  Neck: Normal  Skin: Scalp and body hair: See below    Patchy brown discoloration on the face    ASSESSMENT/PLAN:     Facial lentigos/melasma - discussed diagnosis and treatment options   --Continue daily SPF year round  --Start HQ 6%/tretinoin 0.025%/dexamethasone and apply to face at HS x 4-6 months then stop   --Photograph taken for reference           Follow-up: yearly/PRN sooner   CC:   Scribed By: Jaqueline Nation, MS, PAFER      Again, thank you for allowing me to participate in the care of your patient.        Sincerely,        Jaqueline Nation PA-C

## 2024-06-14 NOTE — PROGRESS NOTES
HPI:   Chief complaints: Balbina Donohue is a pleasant 64 year old female who presents for evaluation of facial discoloration. She has had this for a long time. She does wear sunscreen every day and will reapply if she is outside. She has tried an OTC cream but this was not helpful. No other spots of concern today      PHYSICAL EXAM:    LMP 10/06/2005   Breastfeeding No   Skin exam performed as follows: Type 3 skin. Mood appropriate  Alert and Oriented X 3. Well developed, well nourished in no distress.  General appearance: Normal  Head including face: Normal  Eyes: conjunctiva and lids: Normal  Mouth: Lips, teeth, gums: Normal  Neck: Normal  Skin: Scalp and body hair: See below    Patchy brown discoloration on the face    ASSESSMENT/PLAN:     Facial lentigos/melasma - discussed diagnosis and treatment options   --Continue daily SPF year round  --Start HQ 6%/tretinoin 0.025%/dexamethasone and apply to face at HS x 4-6 months then stop   --Photograph taken for reference           Follow-up: yearly/PRN sooner   CC:   Scribed By: Jaqueline Nation, MS, PA-C

## 2024-09-23 ENCOUNTER — TRANSFERRED RECORDS (OUTPATIENT)
Dept: HEALTH INFORMATION MANAGEMENT | Facility: CLINIC | Age: 64
End: 2024-09-23
Payer: COMMERCIAL

## 2024-09-23 LAB
ALT SERPL-CCNC: 22 IU/L (ref 6–32)
AST SERPL-CCNC: 35 U/L (ref 10–35)
CREATININE (EXTERNAL): 0.7 MG/DL (ref 0.5–1.05)

## 2024-10-07 ENCOUNTER — PATIENT OUTREACH (OUTPATIENT)
Dept: INTERNAL MEDICINE | Facility: CLINIC | Age: 64
End: 2024-10-07
Payer: COMMERCIAL

## 2024-10-07 DIAGNOSIS — R87.610 ASCUS OF CERVIX WITH NEGATIVE HIGH RISK HPV: Primary | ICD-10-CM

## 2024-10-31 NOTE — TELEPHONE ENCOUNTER
Patient due for Pap and HPV.    Reminder done today via telephone call.    left message with  [phone]

## 2024-12-22 ENCOUNTER — HEALTH MAINTENANCE LETTER (OUTPATIENT)
Age: 64
End: 2024-12-22

## 2025-01-04 ENCOUNTER — OFFICE VISIT (OUTPATIENT)
Dept: URGENT CARE | Facility: URGENT CARE | Age: 65
End: 2025-01-04
Payer: COMMERCIAL

## 2025-01-04 ENCOUNTER — ANCILLARY PROCEDURE (OUTPATIENT)
Dept: GENERAL RADIOLOGY | Facility: CLINIC | Age: 65
End: 2025-01-04
Attending: NURSE PRACTITIONER
Payer: COMMERCIAL

## 2025-01-04 VITALS
BODY MASS INDEX: 22.86 KG/M2 | SYSTOLIC BLOOD PRESSURE: 149 MMHG | OXYGEN SATURATION: 96 % | WEIGHT: 125 LBS | HEART RATE: 93 BPM | RESPIRATION RATE: 19 BRPM | DIASTOLIC BLOOD PRESSURE: 89 MMHG | TEMPERATURE: 98.5 F

## 2025-01-04 DIAGNOSIS — R05.3 PERSISTENT COUGH FOR 3 WEEKS OR LONGER: Primary | ICD-10-CM

## 2025-01-04 LAB
BASOPHILS # BLD AUTO: 0 10E3/UL (ref 0–0.2)
BASOPHILS NFR BLD AUTO: 0 %
D DIMER PPP FEU-MCNC: 0.35 UG/ML FEU (ref 0–0.5)
EOSINOPHIL # BLD AUTO: 0.2 10E3/UL (ref 0–0.7)
EOSINOPHIL NFR BLD AUTO: 3 %
ERYTHROCYTE [DISTWIDTH] IN BLOOD BY AUTOMATED COUNT: 12.4 % (ref 10–15)
HCT VFR BLD AUTO: 34.4 % (ref 35–47)
HGB BLD-MCNC: 11.7 G/DL (ref 11.7–15.7)
IMM GRANULOCYTES # BLD: 0 10E3/UL
IMM GRANULOCYTES NFR BLD: 0 %
LYMPHOCYTES # BLD AUTO: 1.7 10E3/UL (ref 0.8–5.3)
LYMPHOCYTES NFR BLD AUTO: 23 %
MCH RBC QN AUTO: 31.5 PG (ref 26.5–33)
MCHC RBC AUTO-ENTMCNC: 34 G/DL (ref 31.5–36.5)
MCV RBC AUTO: 93 FL (ref 78–100)
MONOCYTES # BLD AUTO: 1.2 10E3/UL (ref 0–1.3)
MONOCYTES NFR BLD AUTO: 16 %
NEUTROPHILS # BLD AUTO: 4.1 10E3/UL (ref 1.6–8.3)
NEUTROPHILS NFR BLD AUTO: 57 %
PLATELET # BLD AUTO: 369 10E3/UL (ref 150–450)
RBC # BLD AUTO: 3.71 10E6/UL (ref 3.8–5.2)
WBC # BLD AUTO: 7.3 10E3/UL (ref 4–11)

## 2025-01-04 PROCEDURE — 85379 FIBRIN DEGRADATION QUANT: CPT | Performed by: NURSE PRACTITIONER

## 2025-01-04 PROCEDURE — 85025 COMPLETE CBC W/AUTO DIFF WBC: CPT | Performed by: NURSE PRACTITIONER

## 2025-01-04 PROCEDURE — 99213 OFFICE O/P EST LOW 20 MIN: CPT | Performed by: NURSE PRACTITIONER

## 2025-01-04 PROCEDURE — 71046 X-RAY EXAM CHEST 2 VIEWS: CPT | Mod: TC | Performed by: RADIOLOGY

## 2025-01-04 PROCEDURE — 36415 COLL VENOUS BLD VENIPUNCTURE: CPT | Performed by: NURSE PRACTITIONER

## 2025-01-04 NOTE — PROGRESS NOTES
ICD-10-CM    1. Persistent cough for 3 weeks or longer  R05.3 CBC with platelets and differential     D dimer quantitative     XR Chest 2 Views     CBC with platelets and differential     CANCELED: XR Chest 2 Views      Rest.  Fluids.  Delsym for cough suppression at night.  Mucinex as desired during the day.  Tylenol or ibuprofen as needed for fever or pain.  Instructed patient to be seen again if she develops a new fever or shortness of breath.    Red flag warning signs and when to go to the emergency room discussed.      Labs:  Results for orders placed or performed in visit on 01/04/25 (from the past 24 hours)   D dimer quantitative   Result Value Ref Range    D-Dimer Quantitative 0.35 0.00 - 0.50 ug/mL FEU    Narrative    This D-dimer assay is intended for use in conjunction with a clinical pretest probability assessment model to exclude pulmonary embolism (PE) and deep venous thrombosis (DVT) in outpatients suspected of PE or DVT. The cut-off value is 0.50 ug/mL FEU.    For patients 50 years of age or older, the application of age-adjusted cut-off values for D-Dimer may increase the specificity without significant effect on sensitivity. The literature suggested calculation age adjusted cut-off in ug/L = age in years x 10 ug/L. The results in this laboratory are reported as ug/mL rather than ug/L. The calculation for age adjusted cut off in ug/mL= age in years x 0.01 ug/mL. For example, the cut off for a 76 year old male is 76 x 0.01 ug/mL = 0.76 ug/mL (760 ug/L).    M Antoine et al. Age adjusted D-dimer cut-off levels to rule out pulmonary embolism: The ADJUST-PE Study. DIEGO 2014;311:2545-0318.; KAREN Russell et al. Diagnostic accuracy of conventional or age adjusted D-dimer cutoff values in older patients with suspected venous thromboembolism. Systemic review and meta-analysis. BMJ 2013:346:f2492.   CBC with platelets and differential    Narrative    The following orders were created for panel order CBC with  platelets and differential.  Procedure                               Abnormality         Status                     ---------                               -----------         ------                     CBC with platelets and d...[[id number]]  Abnormal            Final result                 Please view results for these tests on the individual orders.   CBC with platelets and differential   Result Value Ref Range    WBC Count 7.3 4.0 - 11.0 10e3/uL    RBC Count 3.71 (L) 3.80 - 5.20 10e6/uL    Hemoglobin 11.7 11.7 - 15.7 g/dL    Hematocrit 34.4 (L) 35.0 - 47.0 %    MCV 93 78 - 100 fL    MCH 31.5 26.5 - 33.0 pg    MCHC 34.0 31.5 - 36.5 g/dL    RDW 12.4 10.0 - 15.0 %    Platelet Count 369 150 - 450 10e3/uL    % Neutrophils 57 %    % Lymphocytes 23 %    % Monocytes 16 %    % Eosinophils 3 %    % Basophils 0 %    % Immature Granulocytes 0 %    Absolute Neutrophils 4.1 1.6 - 8.3 10e3/uL    Absolute Lymphocytes 1.7 0.8 - 5.3 10e3/uL    Absolute Monocytes 1.2 0.0 - 1.3 10e3/uL    Absolute Eosinophils 0.2 0.0 - 0.7 10e3/uL    Absolute Basophils 0.0 0.0 - 0.2 10e3/uL    Absolute Immature Granulocytes 0.0 <=0.4 10e3/uL     Chest x-ray as read by this provider shows no acute infiltrates, effusions or pneumothorax.  Heart is within normal size limits.    SUBJECTIVE:   Balbina Donohue is a 64 year old female presenting with a chief complaint of   Chief Complaint   Patient presents with    Cough     Cough for the last three weeks.   Three week history of cough, had fevers at first    Review of systems is negative except for as noted in the HPI.    OBJECTIVE  BP (!) 149/89 (BP Location: Right arm, Patient Position: Sitting, Cuff Size: Adult Regular)   Pulse 93   Temp 98.5  F (36.9  C) (Oral)   Resp 19   Wt 56.7 kg (125 lb)   LMP 10/06/2005   SpO2 96%   BMI 22.86 kg/m        GENERAL: Alert, mild distress  SKIN: skin is clear, no rash or abnormal pigmentation  HEAD: The head is normocephalic.   EYES: The eyes are normal. The  conjunctivae and cornea normal.   NECK: The neck is supple and thyroid is normal, no masses; LYMPH NODES: No adenopathy  HENT: Bilateral tympanic membranes and canals appear normal, nasal passages are swollen with clear rhinorrhea, pharynx appears normal  LUNGS: The lung fields are clear to auscultation, no rales, rhonchi, wheezing or retractions  CV: Rhythm is regular. S1 and S2 are normal. No murmurs.  EXTREMITIES: Symmetric extremities no deformities    RICHIE Bernal, CNP  [facility] Provider    The use of Dragon/[employer] dictation services may have been used to construct the content in this note; any grammatical or spelling errors are non-intentional. Please contact the author of this note directly if you are in need of any clarification.

## 2025-01-04 NOTE — PATIENT INSTRUCTIONS
Delsym cough syrup for cough at night.  This may cause drowsiness.    If you develop severe shortness of breath go to the emergency room.    It is not unusual for cough still last for weeks even months after a viral illness.  If you get a new fever over 100.4 or develop difficulty breathing please return.

## 2025-02-08 ENCOUNTER — OFFICE VISIT (OUTPATIENT)
Dept: URGENT CARE | Facility: URGENT CARE | Age: 65
End: 2025-02-08
Payer: COMMERCIAL

## 2025-02-08 VITALS
SYSTOLIC BLOOD PRESSURE: 146 MMHG | TEMPERATURE: 99.1 F | WEIGHT: 126 LBS | BODY MASS INDEX: 23.05 KG/M2 | RESPIRATION RATE: 18 BRPM | HEART RATE: 81 BPM | DIASTOLIC BLOOD PRESSURE: 87 MMHG | OXYGEN SATURATION: 99 %

## 2025-02-08 DIAGNOSIS — R05.8 PRODUCTIVE COUGH: Primary | ICD-10-CM

## 2025-02-08 LAB
FLUAV AG SPEC QL IA: POSITIVE
FLUBV AG SPEC QL IA: NEGATIVE

## 2025-02-08 PROCEDURE — 87804 INFLUENZA ASSAY W/OPTIC: CPT | Performed by: FAMILY MEDICINE

## 2025-02-08 PROCEDURE — 99214 OFFICE O/P EST MOD 30 MIN: CPT | Performed by: FAMILY MEDICINE

## 2025-02-08 RX ORDER — BENZONATATE 200 MG/1
200 CAPSULE ORAL 3 TIMES DAILY PRN
Qty: 21 CAPSULE | Refills: 0 | Status: SHIPPED | OUTPATIENT
Start: 2025-02-08 | End: 2025-02-15

## 2025-02-08 RX ORDER — ALBUTEROL SULFATE 90 UG/1
2 INHALANT RESPIRATORY (INHALATION) EVERY 6 HOURS
Qty: 18 G | Refills: 0 | Status: SHIPPED | OUTPATIENT
Start: 2025-02-08 | End: 2025-03-10

## 2025-02-08 RX ORDER — PREDNISONE 20 MG/1
20 TABLET ORAL 2 TIMES DAILY
Qty: 10 TABLET | Refills: 0 | Status: SHIPPED | OUTPATIENT
Start: 2025-02-08 | End: 2025-02-13

## 2025-02-08 RX ORDER — DOXYCYCLINE 100 MG/1
100 TABLET ORAL 2 TIMES DAILY
Qty: 20 TABLET | Refills: 0 | Status: SHIPPED | OUTPATIENT
Start: 2025-02-08 | End: 2025-02-18

## 2025-02-08 NOTE — PROGRESS NOTES
SUBJECTIVE: Balbina Donohue is a 65 year old female presenting with a chief complaint of cough .  Onset of symptoms was 1 month(s) ago.  Predisposing factors include None.    Past Medical History:   Diagnosis Date    Abnormal glandular Papanicolaou smear of cervix 03/01/2000    per patient f/u paps normal    ASCUS on Pap smear 12/18/2009    Hpv-neg.    Esophageal stricture 06/21/2010    benign esophageal stricture dilated at EGD    Normal delivery     2 childbirths, no complications with regnancies or delivery    Thyroid nodule 09/08/2010    left thyroid lobectomy, benign degenerated colloid nodule per path     Allergies   Allergen Reactions    Prevacid [Lansoprazole] Rash     Social History     Tobacco Use    Smoking status: Never    Smokeless tobacco: Never   Substance Use Topics    Alcohol use: No       ROS:  SKIN: no rash  GI: no vomiting    OBJECTIVE:  BP (!) 146/87 (BP Location: Left arm, Patient Position: Sitting, Cuff Size: Adult Regular)   Pulse 81   Temp 99.1  F (37.3  C) (Oral)   Resp 18   Wt 57.2 kg (126 lb)   LMP 10/06/2005   SpO2 99%   BMI 23.05 kg/m  GENERAL APPEARANCE: healthy, alert and no distress  EYES: EOMI,  PERRL, conjunctiva clear  HENT: ear canals and TM's normal.  Nose and mouth without ulcers, erythema or lesions  RESP: lungs clear to auscultation - no rales, rhonchi or wheezes  SKIN: no suspicious lesions or rashes      ICD-10-CM    1. Productive cough  R05.8 Influenza A & B Antigen - Clinic Collect     doxycycline monohydrate (ADOXA) 100 MG tablet     predniSONE (DELTASONE) 20 MG tablet     albuterol (PROAIR HFA) 108 (90 Base) MCG/ACT inhaler     benzonatate (TESSALON) 200 MG capsule          Fluids/Rest, f/u if worse/not any better

## 2025-02-11 ENCOUNTER — OFFICE VISIT (OUTPATIENT)
Dept: URGENT CARE | Facility: URGENT CARE | Age: 65
End: 2025-02-11
Payer: COMMERCIAL

## 2025-02-11 ENCOUNTER — VIRTUAL VISIT (OUTPATIENT)
Dept: INTERPRETER SERVICES | Facility: CLINIC | Age: 65
End: 2025-02-11

## 2025-02-11 ENCOUNTER — HOSPITAL ENCOUNTER (EMERGENCY)
Facility: CLINIC | Age: 65
Discharge: HOME | End: 2025-02-11
Attending: EMERGENCY MEDICINE | Admitting: EMERGENCY MEDICINE
Payer: COMMERCIAL

## 2025-02-11 VITALS
RESPIRATION RATE: 16 BRPM | HEART RATE: 94 BPM | HEIGHT: 62 IN | TEMPERATURE: 98.8 F | BODY MASS INDEX: 21.35 KG/M2 | OXYGEN SATURATION: 98 % | DIASTOLIC BLOOD PRESSURE: 118 MMHG | WEIGHT: 116 LBS | SYSTOLIC BLOOD PRESSURE: 153 MMHG

## 2025-02-11 VITALS
HEART RATE: 85 BPM | DIASTOLIC BLOOD PRESSURE: 97 MMHG | TEMPERATURE: 99.3 F | OXYGEN SATURATION: 98 % | RESPIRATION RATE: 20 BRPM | SYSTOLIC BLOOD PRESSURE: 157 MMHG

## 2025-02-11 DIAGNOSIS — J20.9 ACUTE BRONCHITIS WITH SYMPTOMS > 10 DAYS: Primary | ICD-10-CM

## 2025-02-11 DIAGNOSIS — R11.2 NAUSEA AND VOMITING, UNSPECIFIED VOMITING TYPE: ICD-10-CM

## 2025-02-11 DIAGNOSIS — K29.70 GASTRITIS WITHOUT BLEEDING, UNSPECIFIED CHRONICITY, UNSPECIFIED GASTRITIS TYPE: ICD-10-CM

## 2025-02-11 DIAGNOSIS — K92.0 SYMPTOM OF BLOOD IN VOMIT: ICD-10-CM

## 2025-02-11 LAB
ABO + RH BLD: NORMAL
ALBUMIN SERPL BCG-MCNC: 4.2 G/DL (ref 3.5–5.2)
ALBUMIN SERPL-MCNC: 4.3 G/DL (ref 3.4–5)
ALP SERPL-CCNC: 69 U/L (ref 40–150)
ALP SERPL-CCNC: 97 U/L (ref 40–150)
ALT SERPL W P-5'-P-CCNC: 21 U/L (ref 0–50)
ALT SERPL W P-5'-P-CCNC: 25 U/L (ref 0–50)
ANION GAP SERPL CALCULATED.3IONS-SCNC: 14 MMOL/L (ref 7–15)
ANION GAP SERPL CALCULATED.3IONS-SCNC: 9 MMOL/L (ref 3–14)
AST SERPL W P-5'-P-CCNC: 39 U/L (ref 0–45)
AST SERPL W P-5'-P-CCNC: 43 U/L (ref 0–45)
BASOPHILS # BLD AUTO: 0 10E3/UL (ref 0–0.2)
BASOPHILS # BLD AUTO: 0 10E3/UL (ref 0–0.2)
BASOPHILS NFR BLD AUTO: 0 %
BASOPHILS NFR BLD AUTO: 0 %
BILIRUB SERPL-MCNC: 0.2 MG/DL
BILIRUB SERPL-MCNC: 0.5 MG/DL (ref 0.2–1.3)
BLD GP AB SCN SERPL QL: NEGATIVE
BUN SERPL-MCNC: 20 MG/DL (ref 7–30)
BUN SERPL-MCNC: 21.7 MG/DL (ref 8–23)
CALCIUM SERPL-MCNC: 8.8 MG/DL (ref 8.8–10.4)
CALCIUM SERPL-MCNC: 9.6 MG/DL (ref 8.5–10.1)
CHLORIDE BLD-SCNC: 104 MMOL/L (ref 94–109)
CHLORIDE SERPL-SCNC: 100 MMOL/L (ref 98–107)
CO2 SERPL-SCNC: 31 MMOL/L (ref 20–32)
CREAT SERPL-MCNC: 0.77 MG/DL (ref 0.51–0.95)
CREAT SERPL-MCNC: 1 MG/DL (ref 0.52–1.04)
EGFRCR SERPLBLD CKD-EPI 2021: 63 ML/MIN/1.73M2
EGFRCR SERPLBLD CKD-EPI 2021: 86 ML/MIN/1.73M2
EOSINOPHIL # BLD AUTO: 0 10E3/UL (ref 0–0.7)
EOSINOPHIL # BLD AUTO: 0 10E3/UL (ref 0–0.7)
EOSINOPHIL NFR BLD AUTO: 0 %
EOSINOPHIL NFR BLD AUTO: 0 %
ERYTHROCYTE [DISTWIDTH] IN BLOOD BY AUTOMATED COUNT: 12.2 % (ref 10–15)
ERYTHROCYTE [DISTWIDTH] IN BLOOD BY AUTOMATED COUNT: 12.6 % (ref 10–15)
GLUCOSE BLD-MCNC: 110 MG/DL (ref 70–99)
GLUCOSE SERPL-MCNC: 120 MG/DL (ref 70–99)
HCO3 SERPL-SCNC: 25 MMOL/L (ref 22–29)
HCT VFR BLD AUTO: 36.1 % (ref 35–47)
HCT VFR BLD AUTO: 37.7 % (ref 35–47)
HGB BLD-MCNC: 12.3 G/DL (ref 11.7–15.7)
HGB BLD-MCNC: 12.9 G/DL (ref 11.7–15.7)
IMM GRANULOCYTES # BLD: 0 10E3/UL
IMM GRANULOCYTES # BLD: 0 10E3/UL
IMM GRANULOCYTES NFR BLD: 1 %
IMM GRANULOCYTES NFR BLD: 1 %
LYMPHOCYTES # BLD AUTO: 1.1 10E3/UL (ref 0.8–5.3)
LYMPHOCYTES # BLD AUTO: 1.1 10E3/UL (ref 0.8–5.3)
LYMPHOCYTES NFR BLD AUTO: 17 %
LYMPHOCYTES NFR BLD AUTO: 17 %
MCH RBC QN AUTO: 30.4 PG (ref 26.5–33)
MCH RBC QN AUTO: 30.7 PG (ref 26.5–33)
MCHC RBC AUTO-ENTMCNC: 34.1 G/DL (ref 31.5–36.5)
MCHC RBC AUTO-ENTMCNC: 34.2 G/DL (ref 31.5–36.5)
MCV RBC AUTO: 89 FL (ref 78–100)
MCV RBC AUTO: 90 FL (ref 78–100)
MONOCYTES # BLD AUTO: 1 10E3/UL (ref 0–1.3)
MONOCYTES # BLD AUTO: 1.1 10E3/UL (ref 0–1.3)
MONOCYTES NFR BLD AUTO: 16 %
MONOCYTES NFR BLD AUTO: 17 %
NEUTROPHILS # BLD AUTO: 4.3 10E3/UL (ref 1.6–8.3)
NEUTROPHILS # BLD AUTO: 4.4 10E3/UL (ref 1.6–8.3)
NEUTROPHILS NFR BLD AUTO: 66 %
NEUTROPHILS NFR BLD AUTO: 67 %
NRBC # BLD AUTO: 0 10E3/UL
NRBC BLD AUTO-RTO: 0 /100
PLATELET # BLD AUTO: 324 10E3/UL (ref 150–450)
PLATELET # BLD AUTO: 352 10E3/UL (ref 150–450)
POTASSIUM BLD-SCNC: 4.7 MMOL/L (ref 3.4–5.3)
POTASSIUM SERPL-SCNC: 3.9 MMOL/L (ref 3.4–5.3)
PROT SERPL-MCNC: 7 G/DL (ref 6.4–8.3)
PROT SERPL-MCNC: 7.4 G/DL (ref 6.8–8.8)
RBC # BLD AUTO: 4.01 10E6/UL (ref 3.8–5.2)
RBC # BLD AUTO: 4.25 10E6/UL (ref 3.8–5.2)
SODIUM SERPL-SCNC: 139 MMOL/L (ref 135–145)
SODIUM SERPL-SCNC: 144 MMOL/L (ref 135–145)
SPECIMEN EXP DATE BLD: NORMAL
WBC # BLD AUTO: 6.6 10E3/UL (ref 4–11)
WBC # BLD AUTO: 6.6 10E3/UL (ref 4–11)

## 2025-02-11 PROCEDURE — 82247 BILIRUBIN TOTAL: CPT | Performed by: EMERGENCY MEDICINE

## 2025-02-11 PROCEDURE — 250N000011 HC RX IP 250 OP 636: Performed by: EMERGENCY MEDICINE

## 2025-02-11 PROCEDURE — 85004 AUTOMATED DIFF WBC COUNT: CPT | Performed by: EMERGENCY MEDICINE

## 2025-02-11 PROCEDURE — 96361 HYDRATE IV INFUSION ADD-ON: CPT

## 2025-02-11 PROCEDURE — 84155 ASSAY OF PROTEIN SERUM: CPT | Performed by: EMERGENCY MEDICINE

## 2025-02-11 PROCEDURE — 96375 TX/PRO/DX INJ NEW DRUG ADDON: CPT

## 2025-02-11 PROCEDURE — 84450 TRANSFERASE (AST) (SGOT): CPT | Performed by: EMERGENCY MEDICINE

## 2025-02-11 PROCEDURE — 85025 COMPLETE CBC W/AUTO DIFF WBC: CPT | Performed by: EMERGENCY MEDICINE

## 2025-02-11 PROCEDURE — 86900 BLOOD TYPING SEROLOGIC ABO: CPT | Performed by: EMERGENCY MEDICINE

## 2025-02-11 PROCEDURE — 36415 COLL VENOUS BLD VENIPUNCTURE: CPT | Performed by: EMERGENCY MEDICINE

## 2025-02-11 PROCEDURE — 96374 THER/PROPH/DIAG INJ IV PUSH: CPT

## 2025-02-11 PROCEDURE — T1013 SIGN LANG/ORAL INTERPRETER: HCPCS

## 2025-02-11 PROCEDURE — 258N000003 HC RX IP 258 OP 636: Performed by: EMERGENCY MEDICINE

## 2025-02-11 PROCEDURE — 99284 EMERGENCY DEPT VISIT MOD MDM: CPT | Mod: 25

## 2025-02-11 RX ORDER — ONDANSETRON 4 MG/1
4 TABLET, ORALLY DISINTEGRATING ORAL EVERY 8 HOURS PRN
Qty: 12 TABLET | Refills: 0 | Status: SHIPPED | OUTPATIENT
Start: 2025-02-11 | End: 2025-02-11

## 2025-02-11 RX ORDER — ONDANSETRON 4 MG/1
4 TABLET, ORALLY DISINTEGRATING ORAL EVERY 8 HOURS PRN
Qty: 10 TABLET | Refills: 0 | Status: SHIPPED | OUTPATIENT
Start: 2025-02-11 | End: 2025-02-14

## 2025-02-11 RX ORDER — FAMOTIDINE 20 MG/1
20 TABLET, FILM COATED ORAL 2 TIMES DAILY
Qty: 20 TABLET | Refills: 0 | Status: SHIPPED | OUTPATIENT
Start: 2025-02-11

## 2025-02-11 RX ORDER — ONDANSETRON 2 MG/ML
4 INJECTION INTRAMUSCULAR; INTRAVENOUS ONCE
Status: COMPLETED | OUTPATIENT
Start: 2025-02-11 | End: 2025-02-11

## 2025-02-11 RX ADMIN — ONDANSETRON 4 MG: 2 INJECTION, SOLUTION INTRAMUSCULAR; INTRAVENOUS at 19:44

## 2025-02-11 RX ADMIN — SODIUM CHLORIDE 1000 ML: 9 INJECTION, SOLUTION INTRAVENOUS at 19:44

## 2025-02-11 RX ADMIN — FAMOTIDINE 20 MG: 10 INJECTION, SOLUTION INTRAVENOUS at 19:44

## 2025-02-11 ASSESSMENT — ACTIVITIES OF DAILY LIVING (ADL)
ADLS_ACUITY_SCORE: 41

## 2025-02-11 ASSESSMENT — COLUMBIA-SUICIDE SEVERITY RATING SCALE - C-SSRS
1. IN THE PAST MONTH, HAVE YOU WISHED YOU WERE DEAD OR WISHED YOU COULD GO TO SLEEP AND NOT WAKE UP?: NO
2. HAVE YOU ACTUALLY HAD ANY THOUGHTS OF KILLING YOURSELF IN THE PAST MONTH?: NO
6. HAVE YOU EVER DONE ANYTHING, STARTED TO DO ANYTHING, OR PREPARED TO DO ANYTHING TO END YOUR LIFE?: NO

## 2025-02-11 NOTE — PROGRESS NOTES
Assessment & Plan     Acute bronchitis with symptoms > 10 days    Bronchitis is inflammation of the bronchial tubes, which carry air to the lungs. The tubes swell and produce mucus, or phlegm. The mucus and inflamed bronchial tubes make you cough. You may have trouble breathing.  Most cases of bronchitis are caused by viruses but in your case we are more concerned about a bacterial infection. . Antibiotics usually are helpful in this situation to help you clear this bacterial infection.  Bronchitis usually develops rapidly and lasts about 2 to 3 weeks in otherwise healthy people.    Continue doxy, take with food and full glass of water    Nausea and vomiting, unspecified vomiting type    CBC is normal, no signs of anemia  CMP normal, no signs of renal or hepatic problems    The 'BRAT' diet is suggested, then progress to diet as tolerated as symptoms anu. Call if bloody stools, persistent diarrhea, vomiting, fever or abdominal pain.    Zofran prn nausea and vomiting  - CBC with platelets and differential  - Comprehensive metabolic panel (BMP + Alb, Alk Phos, ALT, AST, Total. Bili, TP)  - CBC with platelets and differential  - Comprehensive metabolic panel (BMP + Alb, Alk Phos, ALT, AST, Total. Bili, TP)  - ondansetron (ZOFRAN ODT) 4 MG ODT tab  Dispense: 12 tablet; Refill: 0    Gastritis without bleeding, unspecified chronicity, unspecified gastritis type    When you are nauseated, you may feel weak and sweaty and notice a lot of saliva in your mouth. Nausea often leads to vomiting. Most of the time you do not need to worry about nausea and vomiting, but they can be signs of other illnesses.  Two common causes of nausea and vomiting are a stomach infection and food poisoning. Nausea and vomiting from a viral stomach infection will usually start to improve within 24 hours. Nausea and vomiting from food poisoning may last from 12 to 48 hours.    - Comprehensive metabolic panel (BMP + Alb, Alk Phos, ALT, AST, Total.  Bili, TP)  - Comprehensive metabolic panel (BMP + Alb, Alk Phos, ALT, AST, Total. Bili, TP)  - famotidine (PEPCID) 20 MG tablet  Dispense: 20 tablet; Refill: 0         At today's visit with Balbina Donohue was sent to the ED for vomiting up blood and what appears to be coffee grounds, there is a concern for GI bleed.  Patient will be taken to the ED by her .        No follow-ups on file.    Daniel Campuzano, [facility], PA-C  [facility] JAVIER Mortensen is a 65 year old female who presents to clinic today for the following health issues:  Chief Complaint   Patient presents with    Vomiting     Patient here with complaint of ongoing cough for the past several weeks. States she has been seen here a few times in the past few weeks for the cough. As of today, she states she is now experiencing vomiting and concerned that it has a dark color to it.        HPI  Review of Systems  Constitutional, HEENT, cardiovascular, pulmonary, GI, , musculoskeletal, neuro, skin, endocrine and psych systems are negative, except as otherwise noted.      Objective    BP (!) 157/97 (BP Location: Left arm, Patient Position: Sitting, Cuff Size: Adult Regular)   Pulse 85   Temp 99.3  F (37.4  C) (Tympanic)   Resp 20   LMP 10/06/2005   SpO2 98%   Physical Exam   GENERAL: alert and no distress  EYES: Eyes grossly normal to inspection, PERRL and conjunctivae and sclerae normal  RESP: lungs clear to auscultation - no rales, rhonchi or wheezes  CV: regular rate and rhythm, normal S1 S2, no S3 or S4, no murmur, click or rub, no peripheral edema  ABDOMEN: soft, nontender, no hepatosplenomegaly, no masses and bowel sounds normal  MS: no gross musculoskeletal defects noted, no edema  SKIN: no suspicious lesions or rashes  NEURO: Normal strength and tone, mentation intact and speech normal  PSYCH: mentation appears normal, affect normal/bright      Results for orders placed or performed in visit on 02/11/25    Comprehensive metabolic panel (BMP + Alb, Alk Phos, ALT, AST, Total. Bili, TP)     Status: Abnormal   Result Value Ref Range    Sodium 144 135 - 145 mmol/L    Potassium 4.7 3.4 - 5.3 mmol/L    Chloride 104 94 - 109 mmol/L    Carbon Dioxide (CO2) 31 20 - 32 mmol/L    Anion Gap 9 3 - 14 mmol/L    Urea Nitrogen 20 7 - 30 mg/dL    Creatinine 1.00 0.52 - 1.04 mg/dL    GFR Estimate 63 >60 mL/min/1.73m2    Calcium 9.6 8.5 - 10.1 mg/dL    Glucose 110 (H) 70 - 99 mg/dL    Alkaline Phosphatase 69 40 - 150 U/L    AST 43 0 - 45 U/L    ALT 25 0 - 50 U/L    Protein Total 7.4 6.8 - 8.8 g/dL    Albumin 4.3 3.4 - 5.0 g/dL    Bilirubin Total 0.5 0.2 - 1.3 mg/dL   CBC with platelets and differential     Status: None   Result Value Ref Range    WBC Count 6.6 4.0 - 11.0 10e3/uL    RBC Count 4.25 3.80 - 5.20 10e6/uL    Hemoglobin 12.9 11.7 - 15.7 g/dL    Hematocrit 37.7 35.0 - 47.0 %    MCV 89 78 - 100 fL    MCH 30.4 26.5 - 33.0 pg    MCHC 34.2 31.5 - 36.5 g/dL    RDW 12.2 10.0 - 15.0 %    Platelet Count 352 150 - 450 10e3/uL    % Neutrophils 66 %    % Lymphocytes 17 %    % Monocytes 17 %    % Eosinophils 0 %    % Basophils 0 %    % Immature Granulocytes 1 %    Absolute Neutrophils 4.3 1.6 - 8.3 10e3/uL    Absolute Lymphocytes 1.1 0.8 - 5.3 10e3/uL    Absolute Monocytes 1.1 0.0 - 1.3 10e3/uL    Absolute Eosinophils 0.0 0.0 - 0.7 10e3/uL    Absolute Basophils 0.0 0.0 - 0.2 10e3/uL    Absolute Immature Granulocytes 0.0 <=0.4 10e3/uL   CBC with platelets and differential     Status: None    Narrative    The following orders were created for panel order CBC with platelets and differential.  Procedure                               Abnormality         Status                     ---------                               -----------         ------                     CBC with platelets and d...[[id number]]                      Final result                 Please view results for these tests on the individual orders.

## 2025-02-12 NOTE — ED PROVIDER NOTES
"  Emergency Department Note      History of Present Illness     Chief Complaint   Hematemesis      HPI   Balbina Donohue is a 65 year old female who presents to the ED for evaluation of hematemesis. Patient reports vomiting since yesterday, with onset of hematemesis starting this morning. Notes 10x vomiting episodes. Reports having coffee ground colored vomit. Last episode two hours prior. Denies diarrhea. Patient referred to ED from Urgent Care with concerns of GI bleed. Notes she was seen for bronchitis and prescribed doxycycline. Patient has no history of bleeding issues.     Independent Historian    as detailed above.    Review of External Notes       Past Medical History     Medical History and Problem List   Esophageal stricture  Thyroid nodule  Anemia  Rheumatoid arthritis    Medications   Albuterol  Doxycycline  Pepcid  Gabapentin  Prednisone  Plaquenil  Naproxen    Surgical History   Left thyroid lobectomy    Physical Exam     Patient Vitals for the past 24 hrs:   BP Temp Temp src Pulse Resp SpO2 Height Weight   02/11/25 1833 (!) 166/83 98.8  F (37.1  C) Temporal 92 16 97 % 1.575 m (5' 2\") 52.6 kg (116 lb)     Physical Exam  Gen: well appearing, in no acute distress  Oral : Mucous membranes moist,   Nose: No rhinorhea  Ears: External near normal, without drainage  Eyes: periorbital tissues and sclera normal   Neck: supple, no abnormal swelling  Lungs: Clear bilaterally, no tachypnea or distress, speaks full sentences  CV: Regular rate, regular rhythm  Abd: soft, nontender, nondistended, no rebound/guarding  Ext: no lower extremity edema  Skin: warm, dry, well perfused, no rashes/bruising/lesions on exposed skin  Neuro: alert, no gross motor or sensory deficits,   Psych: pleasant mood, normal affect    Diagnostics     Lab Results   Labs Ordered and Resulted from Time of ED Arrival to Time of ED Departure   COMPREHENSIVE METABOLIC PANEL - Abnormal       Result Value    Sodium 139      Potassium 3.9      " Carbon Dioxide (CO2) 25      Anion Gap 14      Urea Nitrogen 21.7      Creatinine 0.77      GFR Estimate 86      Calcium 8.8      Chloride 100      Glucose 120 (*)     Alkaline Phosphatase 97      AST 39      ALT 21      Protein Total 7.0      Albumin 4.2      Bilirubin Total 0.2     CBC WITH PLATELETS AND DIFFERENTIAL    WBC Count 6.6      RBC Count 4.01      Hemoglobin 12.3      Hematocrit 36.1      MCV 90      MCH 30.7      MCHC 34.1      RDW 12.6      Platelet Count 324      % Neutrophils 67      % Lymphocytes 17      % Monocytes 16      % Eosinophils 0      % Basophils 0      % Immature Granulocytes 1      NRBCs per 100 WBC 0      Absolute Neutrophils 4.4      Absolute Lymphocytes 1.1      Absolute Monocytes 1.0      Absolute Eosinophils 0.0      Absolute Basophils 0.0      Absolute Immature Granulocytes 0.0      Absolute NRBCs 0.0     TYPE AND SCREEN, ADULT    ABO/RH(D) B POS      Antibody Screen Negative      SPECIMEN EXPIRATION DATE [id number]     ABO/RH TYPE AND SCREEN       Imaging   No orders to display       Independent Interpretation   None    ED Course      Medications Administered   Medications   famotidine (PEPCID) injection 20 mg (20 mg Intravenous $Given 2/11/25 1944)   sodium chloride 0.9% BOLUS 1,000 mL (0 mLs Intravenous Stopped 2/11/25 2054)   ondansetron (ZOFRAN) injection 4 mg (4 mg Intravenous $Given 2/11/25 1944)       Procedures   Procedures     Discussion of Management   None    ED Course   ED Course as of 02/11/25 2125 Tue Feb 11, 2025 1935 I obtained history and examined the patient as noted above. Patient is feeling better.   2053 I rechecked and updated the patient.        Additional Documentation  None    Medical Decision Making / Diagnosis     CMS Diagnoses: None    MIPS       None    MDM   Balbina Donohue is a 65 year old female referred to the ED with concern of coffee-ground emesis.  I reviewed her emesis which she brought in on a bag when she arrived and it was clear  watery with a few black flecks in it.  Did not look largely like digested blood to me.  She went about 5 hours without vomiting was given some famotidine fluid bolus and Zofran felt a bit improved, then had another episode of small amount of clear watery emesis after about 5 hours.  Again no sign of bleeding, her hemoglobin is stable.  Have low suspicion for upper GI bleeding at this time.  Suspect gastroenteritis.  She was seen earlier today diagnosed with bronchitis and gave and Augmentin.  I have low suspicion for bacterial infection at this time.  Told her to hold off on antibiotics tonight, suspect her upset stomach will improve tomorrow, labs are within normal limits stomach nontender do not feel imaging is necessary.  Discussed with patient how I think her vomiting will improve overnight and should normalize tomorrow encourage bland diet tomorrow, return if no improvement.    Disposition   The patient was discharged.     Diagnosis     ICD-10-CM    1. Nausea and vomiting, unspecified vomiting type  R11.2            Discharge Medications   New Prescriptions    ONDANSETRON (ZOFRAN ODT) 4 MG ODT TAB    Take 1 tablet (4 mg) by mouth every 8 hours as needed for nausea.     Scribe Disclosure:  I, Shaina Jacobs, am serving as a scribe at 7:55 PM on 2/11/2025 to document services personally performed by Parag Gilmore, based on my observations and the provider's statements to me.        Parag Gilmore MD  02/11/25 6290

## 2025-02-12 NOTE — ED TRIAGE NOTES
Pt presents to ed to be evaluated for hematemesis.   Pt states she started vomiting this am, and has vomited x10. Pt states having coffee ground colored vomit.   Denies abdominal pain. At urgent care, referred here     Triage Assessment (Adult)       Row Name 02/11/25 1834          Triage Assessment    Airway WDL WDL        Respiratory WDL    Respiratory WDL WDL        Cardiac WDL    Cardiac WDL WDL

## 2025-03-24 ENCOUNTER — TRANSFERRED RECORDS (OUTPATIENT)
Dept: HEALTH INFORMATION MANAGEMENT | Facility: CLINIC | Age: 65
End: 2025-03-24
Payer: COMMERCIAL

## 2025-07-30 ENCOUNTER — OFFICE VISIT (OUTPATIENT)
Dept: DERMATOLOGY | Facility: CLINIC | Age: 65
End: 2025-07-30
Payer: COMMERCIAL

## 2025-07-30 DIAGNOSIS — L81.1 MELASMA: Primary | ICD-10-CM

## 2025-07-30 DIAGNOSIS — L81.4 LENTIGO: ICD-10-CM

## 2025-07-30 PROCEDURE — 99213 OFFICE O/P EST LOW 20 MIN: CPT | Performed by: STUDENT IN AN ORGANIZED HEALTH CARE EDUCATION/TRAINING PROGRAM

## 2025-07-30 NOTE — PROGRESS NOTES
[facility] Dermatology Note  Encounter Date: Jul 30, 2025  Office Visit     Reviewed patients past medical history and pertinent chart review prior to patients visit today.     Dermatology Problem List:  # Melasma  -compounded Tranexamic acid 4.8%/azelaic acid 15%/kojic acid 6% cream  ____________________________________________    Assessment & Plan:     # Melasma and solar lentigines  -We discussed the benign nature of this finding. Patient was encouraged to avoid sun exposure to the affected areas as this can increase the appearance of hyperpigmentation. We discussed treatment options including topicals, chemical peels, and laser treatment. Patient to practice good sun protection with a mineral base sunscreen of SPF 30 or more.  -Patient to begin applying a pea-sized amount of compounded Tranexamic acid 4.8%/azelaic acid 15%/kojic acid 6% cream to face once daily until follow up. This was sent to compounding pharmacy in [place].  Discussed this with patient.  They will mail prescription to her house. Pricing reviewed.    Follow up: 6 months     All risks, benefits and alternatives were discussed with patient.  Patient is in agreement and understands the assessment and plan.  All questions were answered.  Alexandria Ordonez PA-C  [facility] Dermatology  _______________________________________    CC: Derm Problem (Follow-up for dark spots on face, patient used the compounded medication and feels like there has been no changes, she feels more dark patches are popping up.)     HPI:  Ms. Balbina Donohue is a(n) 65 year old female who presents today as a return patient for follow up of melasma/solar lentigines. Nilda, audio Kinyarwanda , present during today's visit and helped assist with translation.  Patient was last seen in dermatology on 6/14/2024 at which time she was prescribed compounded HQ 6%/tretinoin 0.025%/dexamethasone for melasma/lentigines.    Today, she reports some  improvement with her hyperpigmentation on her face.  Although, she notes this has not completely resolved particularly on her cheeks and forehead.  She has been using the compounded prescription every day for the past 1 year.  She is diligent with SPF 50.    Patient is otherwise feeling well, without additional skin concerns.    Physical Exam:  SKIN: Focused examination of the face only was performed.  - hyperpigmented patches and macules on the face (forehead, cheeks, glabella and temples)    - No other lesions of concern on areas examined.     Medications:  Current Outpatient Medications   Medication Sig Dispense Refill    COMPOUNDED NON-CONTROLLED SUBSTANCE (CMPD RX) - PHARMACY TO MIX COMPOUNDED MEDICATION Tranexamic acid 4.8%/azelaic acid 15%/kojic acid 6% cream sig apply to face daily for 6 months 30 g 3    acetaminophen (TYLENOL) 500 MG tablet Take 1-2 tablets (500-1,000 mg) by mouth every 6 hours as needed for mild pain 30 tablet 0    albuterol (PROAIR HFA) 108 (90 Base) MCG/ACT inhaler Inhale 2 puffs into the lungs every 6 hours. 18 g 0    albuterol (PROVENTIL HFA) 108 (90 Base) MCG/ACT inhaler Inhale 2 puffs into the lungs every 6 hours 8.5 g 0    amoxicillin-clavulanate (AUGMENTIN) 875-125 MG tablet Take 1 tablet by mouth 2 times daily (Patient not taking: Reported on 2/8/2025) 20 tablet 0    azelaic acid (FINACIA) 15 % external gel Apply to face at night 50 g 0    COMPOUNDED NON-CONTROLLED SUBSTANCE (CMPD RX) - PHARMACY TO MIX COMPOUNDED MEDICATION Hydroquinone 6%/tretinoin 0.025%/dexamethasone 0.1% cream sig apply to face at bedtime x 4-6 months then stop 30 g 3    cyanocobalamin (VITAMIN B-12) 1000 MCG tablet Take 1 tablet (1,000 mcg) by mouth daily 90 tablet 1    dextromethorphan-guaiFENesin (TUSSIN DM)  MG/5ML liquid Take 5 mLs by mouth 4 times daily as needed (Patient not taking: Reported on 2/8/2025) 473 mL 0    famotidine (PEPCID) 20 MG tablet Take 1 tablet (20 mg) by mouth 2 times daily. 20  tablet 0    fluticasone (FLONASE) 50 MCG/ACT nasal spray SPRAY 1 SPRAY INTO BOTH NOSTRILS 2 TIMES DAILY 48 mL 2    gabapentin (NEURONTIN) 300 MG capsule 300mg at bedtime x 3 days, twice daily x 3 days, then three times daily x 3 days. Increase to 600mg TID per titration schedule. 180 capsule 0    guaiFENesin-codeine (ROBITUSSIN AC) 100-10 MG/5ML solution Take 5-10 mLs by mouth nightly as needed for cough 118 mL 0    hydroxychloroquine (PLAQUENIL) 200 MG tablet TAKE 1 TABLET BY MOUTH TWICE A DAY      ibuprofen (ADVIL/MOTRIN) 600 MG tablet Take 1 tablet (600 mg) by mouth every 8 hours as needed for moderate pain Take with food. Stop if blood in stool. 45 tablet 0    NAPROXEN PO Take 500 mg by mouth      predniSONE (DELTASONE) 20 MG tablet Take 1 tablet (20 mg) by mouth 2 times daily (Patient not taking: Reported on 2/8/2025) 10 tablet 0    sulfaSALAzine (AZULFIDINE) 500 MG tablet Take 1,000 mg by mouth 2 times daily (Patient not taking: Reported on 2/8/2025)      tretinoin (RETIN-A) 0.025 % external cream Apply topically at bedtime Apply to face one time a day 45 g 0    VITAMIN D3 50 MCG (2000 UT) tablet Take 2 tablets by mouth daily at 2 pm       No current facility-administered medications for this visit.      Past Medical History:   Patient Active Problem List   Diagnosis    Esophageal stricture    Thyroid nodule    Iron deficiency anemia    B12 deficiency anemia    Vitamin D deficiency    ASCUS of cervix with negative high risk HPV    Rheumatoid arthritis (H)    Vitamin B12 deficiency    Elevated blood-pressure reading without diagnosis of hypertension     Past Medical History:   Diagnosis Date    Abnormal glandular Papanicolaou smear of cervix 03/01/2000    per patient f/u paps normal    ASCUS on Pap smear 12/18/2009    Hpv-neg.    Esophageal stricture 06/21/2010    benign esophageal stricture dilated at EGD    Normal delivery     2 childbirths, no complications with regnancies or delivery    Thyroid nodule  09/08/2010    left thyroid lobectomy, benign degenerated colloid nodule per path       CC Referred Self, MD  No address on file on close of this encounter.

## 2025-07-30 NOTE — LETTER
7/30/2025      Balbina Donohue  [address] [facility] [address]      Dear Colleague,    Thank you for referring your patient, Balbina Donohue, to the [facility]. Please see a copy of my visit note below.    [facility] Dermatology Note  Encounter Date: Jul 30, 2025  Office Visit     Reviewed patients past medical history and pertinent chart review prior to patients visit today.     Dermatology Problem List:  # Melasma  -compounded Tranexamic acid 4.8%/azelaic acid 15%/kojic acid 6% cream  ____________________________________________    Assessment & Plan:     # Melasma and solar lentigines  -We discussed the benign nature of this finding. Patient was encouraged to avoid sun exposure to the affected areas as this can increase the appearance of hyperpigmentation. We discussed treatment options including topicals, chemical peels, and laser treatment. Patient to practice good sun protection with a mineral base sunscreen of SPF 30 or more.  -Patient to begin applying a pea-sized amount of compounded Tranexamic acid 4.8%/azelaic acid 15%/kojic acid 6% cream to face once daily until follow up. This was sent to compounding pharmacy in [place].  Discussed this with patient.  They will mail prescription to her house. Pricing reviewed.    Follow up: 6 months     All risks, benefits and alternatives were discussed with patient.  Patient is in agreement and understands the assessment and plan.  All questions were answered.  Alexandria Ordonez PA-C  [facility] Dermat[facility]  _______________________________________    CC: Derm Problem (Follow-up for dark spots on face, patient used the compounded medication and feels like there has been no changes, she feels more dark patches are popping up.)     HPI:  Ms. Balbina Donohue is a(n) 65 year old female who presents today as a return patient for follow up of melasma/solar lentigines. ronal Munguiaese  , present during today's visit and helped assist with translation.  Patient was last seen in dermatology on 6/14/2024 at which time she was prescribed compounded HQ 6%/tretinoin 0.025%/dexamethasone for melasma/lentigines.    Today, she reports some improvement with her hyperpigmentation on her face.  Although, she notes this has not completely resolved particularly on her cheeks and forehead.  She has been using the compounded prescription every day for the past 1 year.  She is diligent with SPF 50.    Patient is otherwise feeling well, without additional skin concerns.    Physical Exam:  SKIN: Focused examination of the face only was performed.  - hyperpigmented patches and macules on the face (forehead, cheeks, glabella and temples)    - No other lesions of concern on areas examined.     Medications:  Current Outpatient Medications   Medication Sig Dispense Refill     COMPOUNDED NON-CONTROLLED SUBSTANCE (CMPD RX) - PHARMACY TO MIX COMPOUNDED MEDICATION Tranexamic acid 4.8%/azelaic acid 15%/kojic acid 6% cream sig apply to face daily for 6 months 30 g 3     acetaminophen (TYLENOL) 500 MG tablet Take 1-2 tablets (500-1,000 mg) by mouth every 6 hours as needed for mild pain 30 tablet 0     albuterol (PROAIR HFA) 108 (90 Base) MCG/ACT inhaler Inhale 2 puffs into the lungs every 6 hours. 18 g 0     albuterol (PROVENTIL HFA) 108 (90 Base) MCG/ACT inhaler Inhale 2 puffs into the lungs every 6 hours 8.5 g 0     amoxicillin-clavulanate (AUGMENTIN) 875-125 MG tablet Take 1 tablet by mouth 2 times daily (Patient not taking: Reported on 2/8/2025) 20 tablet 0     azelaic acid (FINACIA) 15 % external gel Apply to face at night 50 g 0     COMPOUNDED NON-CONTROLLED SUBSTANCE (CMPD RX) - PHARMACY TO MIX COMPOUNDED MEDICATION Hydroquinone 6%/tretinoin 0.025%/dexamethasone 0.1% cream sig apply to face at bedtime x 4-6 months then stop 30 g 3     cyanocobalamin (VITAMIN B-12) 1000 MCG tablet Take 1 tablet (1,000 mcg) by  mouth daily 90 tablet 1     dextromethorphan-guaiFENesin (TUSSIN DM)  MG/5ML liquid Take 5 mLs by mouth 4 times daily as needed (Patient not taking: Reported on 2/8/2025) 473 mL 0     famotidine (PEPCID) 20 MG tablet Take 1 tablet (20 mg) by mouth 2 times daily. 20 tablet 0     fluticasone (FLONASE) 50 MCG/ACT nasal spray SPRAY 1 SPRAY INTO BOTH NOSTRILS 2 TIMES DAILY 48 mL 2     gabapentin (NEURONTIN) 300 MG capsule 300mg at bedtime x 3 days, twice daily x 3 days, then three times daily x 3 days. Increase to 600mg TID per titration schedule. 180 capsule 0     guaiFENesin-codeine (ROBITUSSIN AC) 100-10 MG/5ML solution Take 5-10 mLs by mouth nightly as needed for cough 118 mL 0     hydroxychloroquine (PLAQUENIL) 200 MG tablet TAKE 1 TABLET BY MOUTH TWICE A DAY       ibuprofen (ADVIL/MOTRIN) 600 MG tablet Take 1 tablet (600 mg) by mouth every 8 hours as needed for moderate pain Take with food. Stop if blood in stool. 45 tablet 0     NAPROXEN PO Take 500 mg by mouth       predniSONE (DELTASONE) 20 MG tablet Take 1 tablet (20 mg) by mouth 2 times daily (Patient not taking: Reported on 2/8/2025) 10 tablet 0     sulfaSALAzine (AZULFIDINE) 500 MG tablet Take 1,000 mg by mouth 2 times daily (Patient not taking: Reported on 2/8/2025)       tretinoin (RETIN-A) 0.025 % external cream Apply topically at bedtime Apply to face one time a day 45 g 0     VITAMIN D3 50 MCG (2000 UT) tablet Take 2 tablets by mouth daily at 2 pm       No current facility-administered medications for this visit.      Past Medical History:   Patient Active Problem List   Diagnosis     Esophageal stricture     Thyroid nodule     Iron deficiency anemia     B12 deficiency anemia     Vitamin D deficiency     ASCUS of cervix with negative high risk HPV     Rheumatoid arthritis (H)     Vitamin B12 deficiency     Elevated blood-pressure reading without diagnosis of hypertension     Past Medical History:   Diagnosis Date     Abnormal glandular  Papanicolaou smear of cervix 03/01/2000    per patient f/u paps normal     ASCUS on Pap smear 12/18/2009    Hpv-neg.     Esophageal stricture 06/21/2010    benign esophageal stricture dilated at EGD     Normal delivery     2 childbirths, no complications with regnancies or delivery     Thyroid nodule 09/08/2010    left thyroid lobectomy, benign degenerated colloid nodule per path       CC Referred Self, MD  No address on file on close of this encounter.     Again, thank you for allowing me to participate in the care of your patient.        Sincerely,        Wendi Ordonez PA-C    Electronically signed